# Patient Record
Sex: MALE | Race: WHITE | NOT HISPANIC OR LATINO | Employment: OTHER | URBAN - METROPOLITAN AREA
[De-identification: names, ages, dates, MRNs, and addresses within clinical notes are randomized per-mention and may not be internally consistent; named-entity substitution may affect disease eponyms.]

---

## 2018-07-02 ENCOUNTER — RECORDS - HEALTHEAST (OUTPATIENT)
Dept: LAB | Facility: CLINIC | Age: 58
End: 2018-07-02

## 2018-07-03 LAB
CHOLEST SERPL-MCNC: 198 MG/DL
FASTING STATUS PATIENT QL REPORTED: ABNORMAL
HDLC SERPL-MCNC: 44 MG/DL
LDLC SERPL CALC-MCNC: 141 MG/DL
PSA SERPL-MCNC: 0.6 NG/ML (ref 0–3.5)
TRIGL SERPL-MCNC: 63 MG/DL

## 2020-06-07 ENCOUNTER — APPOINTMENT (OUTPATIENT)
Dept: ULTRASOUND IMAGING | Facility: CLINIC | Age: 60
End: 2020-06-07
Attending: FAMILY MEDICINE
Payer: COMMERCIAL

## 2020-06-07 ENCOUNTER — HOSPITAL ENCOUNTER (EMERGENCY)
Facility: CLINIC | Age: 60
Discharge: HOME | End: 2020-06-07
Attending: FAMILY MEDICINE | Admitting: FAMILY MEDICINE
Payer: COMMERCIAL

## 2020-06-07 VITALS
WEIGHT: 185 LBS | SYSTOLIC BLOOD PRESSURE: 146 MMHG | OXYGEN SATURATION: 96 % | HEIGHT: 70 IN | HEART RATE: 82 BPM | RESPIRATION RATE: 18 BRPM | DIASTOLIC BLOOD PRESSURE: 85 MMHG | BODY MASS INDEX: 26.48 KG/M2 | TEMPERATURE: 99.1 F

## 2020-06-07 DIAGNOSIS — L03.116 CELLULITIS OF LEFT LOWER EXTREMITY: ICD-10-CM

## 2020-06-07 LAB
ALBUMIN SERPL-MCNC: 3.6 G/DL (ref 3.4–5)
ALP SERPL-CCNC: 95 U/L (ref 40–150)
ALT SERPL W P-5'-P-CCNC: 22 U/L (ref 0–70)
ANION GAP SERPL CALCULATED.3IONS-SCNC: 10 MMOL/L (ref 3–14)
AST SERPL W P-5'-P-CCNC: 17 U/L (ref 0–45)
BASOPHILS # BLD AUTO: 0.1 10E9/L (ref 0–0.2)
BASOPHILS NFR BLD AUTO: 0.5 %
BILIRUB SERPL-MCNC: 0.5 MG/DL (ref 0.2–1.3)
BUN SERPL-MCNC: 16 MG/DL (ref 7–30)
CALCIUM SERPL-MCNC: 9 MG/DL (ref 8.5–10.1)
CHLORIDE SERPL-SCNC: 106 MMOL/L (ref 94–109)
CO2 SERPL-SCNC: 22 MMOL/L (ref 20–32)
CREAT SERPL-MCNC: 0.9 MG/DL (ref 0.66–1.25)
CRP SERPL-MCNC: 139 MG/L (ref 0–8)
DIFFERENTIAL METHOD BLD: NORMAL
EOSINOPHIL # BLD AUTO: 0.1 10E9/L (ref 0–0.7)
EOSINOPHIL NFR BLD AUTO: 0.8 %
ERYTHROCYTE [DISTWIDTH] IN BLOOD BY AUTOMATED COUNT: 12.9 % (ref 10–15)
ERYTHROCYTE [SEDIMENTATION RATE] IN BLOOD BY WESTERGREN METHOD: 16 MM/H (ref 0–20)
GFR SERPL CREATININE-BSD FRML MDRD: >90 ML/MIN/{1.73_M2}
GLUCOSE SERPL-MCNC: 107 MG/DL (ref 70–99)
HCT VFR BLD AUTO: 47.8 % (ref 40–53)
HGB BLD-MCNC: 16.3 G/DL (ref 13.3–17.7)
IMM GRANULOCYTES # BLD: 0 10E9/L (ref 0–0.4)
IMM GRANULOCYTES NFR BLD: 0.4 %
LYMPHOCYTES # BLD AUTO: 2 10E9/L (ref 0.8–5.3)
LYMPHOCYTES NFR BLD AUTO: 20.5 %
MCH RBC QN AUTO: 30.2 PG (ref 26.5–33)
MCHC RBC AUTO-ENTMCNC: 34.1 G/DL (ref 31.5–36.5)
MCV RBC AUTO: 89 FL (ref 78–100)
MONOCYTES # BLD AUTO: 1.3 10E9/L (ref 0–1.3)
MONOCYTES NFR BLD AUTO: 13 %
NEUTROPHILS # BLD AUTO: 6.4 10E9/L (ref 1.6–8.3)
NEUTROPHILS NFR BLD AUTO: 64.8 %
NRBC # BLD AUTO: 0 10*3/UL
NRBC BLD AUTO-RTO: 0 /100
PLATELET # BLD AUTO: 194 10E9/L (ref 150–450)
POTASSIUM SERPL-SCNC: 3.6 MMOL/L (ref 3.4–5.3)
PROT SERPL-MCNC: 8.1 G/DL (ref 6.8–8.8)
RBC # BLD AUTO: 5.39 10E12/L (ref 4.4–5.9)
SODIUM SERPL-SCNC: 138 MMOL/L (ref 133–144)
WBC # BLD AUTO: 9.8 10E9/L (ref 4–11)

## 2020-06-07 PROCEDURE — 99284 EMERGENCY DEPT VISIT MOD MDM: CPT | Mod: 25 | Performed by: FAMILY MEDICINE

## 2020-06-07 PROCEDURE — 86140 C-REACTIVE PROTEIN: CPT | Performed by: FAMILY MEDICINE

## 2020-06-07 PROCEDURE — 93971 EXTREMITY STUDY: CPT | Mod: LT

## 2020-06-07 PROCEDURE — 80053 COMPREHEN METABOLIC PANEL: CPT | Performed by: FAMILY MEDICINE

## 2020-06-07 PROCEDURE — 99284 EMERGENCY DEPT VISIT MOD MDM: CPT | Mod: Z6 | Performed by: FAMILY MEDICINE

## 2020-06-07 PROCEDURE — 85025 COMPLETE CBC W/AUTO DIFF WBC: CPT | Performed by: FAMILY MEDICINE

## 2020-06-07 PROCEDURE — 85652 RBC SED RATE AUTOMATED: CPT | Performed by: FAMILY MEDICINE

## 2020-06-07 RX ORDER — CEPHALEXIN 500 MG/1
500 CAPSULE ORAL 4 TIMES DAILY
Qty: 40 CAPSULE | Refills: 0 | Status: SHIPPED | OUTPATIENT
Start: 2020-06-07 | End: 2020-06-17

## 2020-06-07 ASSESSMENT — MIFFLIN-ST. JEOR: SCORE: 1660.4

## 2020-06-07 NOTE — ED AVS SNAPSHOT
[facility] Emergency Department  [address] [address]  Phone:  [phone]  Fax:  [phone]                                    Navdeep Dunn   MRN: [id number]    Department:  [facility] Emergency Department   Date of Visit:  6/7/2020           After Visit Summary Signature Page    I have received my discharge instructions, and my questions have been answered. I have discussed any challenges I see with this plan with the nurse or doctor.    ..........................................................................................................................................  Patient/Patient Representative Signature      ..........................................................................................................................................  Patient Representative Print Name and Relationship to Patient    ..................................................               ................................................  Date                                   Time    ..........................................................................................................................................  Reviewed by Signature/Title    ...................................................              ..............................................  Date                                               Time          22EPIC Rev 08/18

## 2020-06-08 NOTE — DISCHARGE INSTRUCTIONS
Return to the Emergency Room if the following occurs:     Severely worsened pain, expanding redness and swelling, fever >101, or for any concern at anytime.    Or, follow-up with the following provider as we discussed:     Call for dermatology appointment on Monday, see contact information provided for scheduling.    Medications discussed:    Keflex.    If you received pain-relieving or sedating medication during your time in the ER, avoid alcohol, driving automobiles, or working with machinery.  Also, a responsible adult must stay with you.        Call the Nurse Advice Line at [phone] or [phone] for any concern at anytime.

## 2020-06-08 NOTE — ED NOTES
"Pt c/o eczema like rash to front of his lower leg, \"it's been there for years\" - yesterday he noticed a rash to the back of his leg that wraps around his inner thigh with some streaking up the leg to his inner thigh. 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 Pulses are present and color is appropriate.  "

## 2020-06-08 NOTE — ED PROVIDER NOTES
"  HPI   Patient is a 59-year-old male presenting with concern about a rash involving his left lower extremity.  He recognized a dry, pruritic area on the anterior portion starting a few weeks ago.  It has not improved over this time.  Then, starting on Friday, 2 days ago, the patient recognized new redness, swelling, and tenderness along the lower leg.  This has expanded since that time.  He describes calf pain especially along the medial aspect.  He describes some radiating tenderness and pain along the medial calf to the knee and lower thigh.  No fever.  He does not feel sick.  He denies chest pain or shortness of breath.  He denies trauma or injury.  He denies intraoral rash or ulceration.  No obvious blood loss.  No history of pulmonary pathology.  No history of cardiac pathology.  He does not take medication on a regular basis.  He does not smoke.  He does not drink alcohol regularly.  No drugs of abuse.        Allergies:  Allergies   Allergen Reactions     Amoxicillin Hives     Problem List:    There are no active problems to display for this patient.     Past Medical History:    No past medical history on file.  Past Surgical History:    No past surgical history on file.  Family History:    No family history on file.  Social History:  Marital Status:   [2]  Social History     Tobacco Use     Smoking status: Not on file   Substance Use Topics     Alcohol use: Not on file     Drug use: Not on file      Medications:    cephALEXin (KEFLEX) 500 MG capsule      Review of Systems   All other systems reviewed and are negative.      PE   BP: (!) 146/85  Pulse: 82  Temp: 99.1  F (37.3  C)  Resp: 18  Height: 177.8 cm (5' 10\")  Weight: 83.9 kg (185 lb)(stated)  SpO2: 96 %  Physical Exam  Vitals signs and nursing note reviewed.   Constitutional:       General: He is not in acute distress.     Appearance: He is not diaphoretic.   HENT:      Head: Atraumatic.      Nose: Nose normal.      Mouth/Throat:      Mouth: " Mucous membranes are moist.      Pharynx: Oropharynx is clear.   Eyes:      General: No scleral icterus.     Pupils: Pupils are equal, round, and reactive to light.   Neck:      Musculoskeletal: Normal range of motion.   Pulmonary:      Effort: No respiratory distress.   Abdominal:      Tenderness: There is no abdominal tenderness.   Musculoskeletal: Normal range of motion.      Comments: Tender over the rash as described below.  No diffuse calf tenderness.  Full range of motion of his left hip and knee and ankle joints.  These are not swollen or tender.  Otherwise not tender to palpation over other major muscles, joints, or long bones.   Skin:     General: Skin is warm.      Findings: No rash.      Comments: See photos.   Neurological:      Mental Status: He is alert and oriented to person, place, and time.   Psychiatric:         Behavior: Behavior normal.                       ED COURSE and [facility]   1953.  The patient has a rash on his left lower extremity, as pictured above.  Infectious?  Vasculitis?  Vascular?  Ultrasound and lab values pending.    2116.  Ultrasound unremarkable.  Keflex will be provided for concern of infection.  Dermatology follow-up recommended for concern of possible vasculitis, especially if not improved over the next 2 to 3 days.  Return here for worsening as discussed.    LABS  Labs Ordered and Resulted from Time of ED Arrival Up to the Time of Departure from the ED   COMPREHENSIVE METABOLIC PANEL - Abnormal; Notable for the following components:       Result Value    Glucose 107 (*)     All other components within normal limits   CRP INFLAMMATION - Abnormal; Notable for the following components:    CRP Inflammation 139.0 (*)     All other components within normal limits   CBC WITH PLATELETS DIFFERENTIAL   ERYTHROCYTE SEDIMENTATION RATE AUTO       IMAGING  Images reviewed by me.  Radiology report also reviewed.  US Lower Extremity Venous Duplex Left   Preliminary Result   IMPRESSION: No  evidence for DVT within the left lower extremity.          Procedures    Medications - No data to display      IMPRESSION       ICD-10-CM    1. Cellulitis of left lower extremity  L03.116             Medication List      Started    cephALEXin 500 MG capsule  Commonly known as:  KEFLEX  500 mg, Oral, 4 TIMES DAILY                          Emile Santillan MD  06/07/20 8119

## 2020-06-19 ENCOUNTER — APPOINTMENT (OUTPATIENT)
Dept: GENERAL RADIOLOGY | Facility: CLINIC | Age: 60
End: 2020-06-19
Attending: EMERGENCY MEDICINE
Payer: COMMERCIAL

## 2020-06-19 ENCOUNTER — HOSPITAL ENCOUNTER (EMERGENCY)
Facility: CLINIC | Age: 60
Discharge: HOME | End: 2020-06-19
Attending: EMERGENCY MEDICINE | Admitting: EMERGENCY MEDICINE
Payer: COMMERCIAL

## 2020-06-19 VITALS
HEIGHT: 70 IN | WEIGHT: 185 LBS | HEART RATE: 81 BPM | OXYGEN SATURATION: 97 % | SYSTOLIC BLOOD PRESSURE: 148 MMHG | TEMPERATURE: 98.2 F | BODY MASS INDEX: 26.48 KG/M2 | DIASTOLIC BLOOD PRESSURE: 95 MMHG | RESPIRATION RATE: 35 BRPM

## 2020-06-19 DIAGNOSIS — I20.0 UNSTABLE ANGINA (H): ICD-10-CM

## 2020-06-19 LAB
ALBUMIN SERPL-MCNC: 3.5 G/DL (ref 3.4–5)
ALP SERPL-CCNC: 106 U/L (ref 40–150)
ALT SERPL W P-5'-P-CCNC: 30 U/L (ref 0–70)
ANION GAP SERPL CALCULATED.3IONS-SCNC: 9 MMOL/L (ref 3–14)
AST SERPL W P-5'-P-CCNC: 19 U/L (ref 0–45)
BASOPHILS # BLD AUTO: 0.1 10E9/L (ref 0–0.2)
BASOPHILS NFR BLD AUTO: 0.7 %
BILIRUB SERPL-MCNC: 0.3 MG/DL (ref 0.2–1.3)
BUN SERPL-MCNC: 13 MG/DL (ref 7–30)
CALCIUM SERPL-MCNC: 8.5 MG/DL (ref 8.5–10.1)
CHLORIDE SERPL-SCNC: 107 MMOL/L (ref 94–109)
CO2 SERPL-SCNC: 25 MMOL/L (ref 20–32)
CREAT SERPL-MCNC: 1.01 MG/DL (ref 0.66–1.25)
D DIMER PPP FEU-MCNC: <0.3 UG/ML FEU (ref 0–0.5)
DIFFERENTIAL METHOD BLD: ABNORMAL
EOSINOPHIL # BLD AUTO: 0.1 10E9/L (ref 0–0.7)
EOSINOPHIL NFR BLD AUTO: 0.7 %
ERYTHROCYTE [DISTWIDTH] IN BLOOD BY AUTOMATED COUNT: 13 % (ref 10–15)
GFR SERPL CREATININE-BSD FRML MDRD: 80 ML/MIN/{1.73_M2}
GLUCOSE SERPL-MCNC: 126 MG/DL (ref 70–99)
HCT VFR BLD AUTO: 46.2 % (ref 40–53)
HGB BLD-MCNC: 15.5 G/DL (ref 13.3–17.7)
IMM GRANULOCYTES # BLD: 0.1 10E9/L (ref 0–0.4)
IMM GRANULOCYTES NFR BLD: 0.5 %
LACTATE BLD-SCNC: 2.5 MMOL/L (ref 0.7–2)
LYMPHOCYTES # BLD AUTO: 1.5 10E9/L (ref 0.8–5.3)
LYMPHOCYTES NFR BLD AUTO: 12.1 %
MCH RBC QN AUTO: 30.3 PG (ref 26.5–33)
MCHC RBC AUTO-ENTMCNC: 33.5 G/DL (ref 31.5–36.5)
MCV RBC AUTO: 90 FL (ref 78–100)
MONOCYTES # BLD AUTO: 1 10E9/L (ref 0–1.3)
MONOCYTES NFR BLD AUTO: 8.2 %
NEUTROPHILS # BLD AUTO: 9.6 10E9/L (ref 1.6–8.3)
NEUTROPHILS NFR BLD AUTO: 77.8 %
NRBC # BLD AUTO: 0 10*3/UL
NRBC BLD AUTO-RTO: 0 /100
PLATELET # BLD AUTO: 266 10E9/L (ref 150–450)
POTASSIUM SERPL-SCNC: 4 MMOL/L (ref 3.4–5.3)
PROT SERPL-MCNC: 7.4 G/DL (ref 6.8–8.8)
RBC # BLD AUTO: 5.12 10E12/L (ref 4.4–5.9)
SODIUM SERPL-SCNC: 141 MMOL/L (ref 133–144)
TROPONIN I SERPL-MCNC: <0.015 UG/L (ref 0–0.04)
WBC # BLD AUTO: 12.4 10E9/L (ref 4–11)

## 2020-06-19 PROCEDURE — 25000132 ZZH RX MED GY IP 250 OP 250 PS 637: Performed by: EMERGENCY MEDICINE

## 2020-06-19 PROCEDURE — 96374 THER/PROPH/DIAG INJ IV PUSH: CPT | Performed by: EMERGENCY MEDICINE

## 2020-06-19 PROCEDURE — 85025 COMPLETE CBC W/AUTO DIFF WBC: CPT | Performed by: EMERGENCY MEDICINE

## 2020-06-19 PROCEDURE — 93005 ELECTROCARDIOGRAM TRACING: CPT | Performed by: EMERGENCY MEDICINE

## 2020-06-19 PROCEDURE — 93010 ELECTROCARDIOGRAM REPORT: CPT | Mod: Z6 | Performed by: EMERGENCY MEDICINE

## 2020-06-19 PROCEDURE — 25800030 ZZH RX IP 258 OP 636: Performed by: EMERGENCY MEDICINE

## 2020-06-19 PROCEDURE — 71045 X-RAY EXAM CHEST 1 VIEW: CPT

## 2020-06-19 PROCEDURE — 80053 COMPREHEN METABOLIC PANEL: CPT | Performed by: EMERGENCY MEDICINE

## 2020-06-19 PROCEDURE — 84484 ASSAY OF TROPONIN QUANT: CPT | Performed by: EMERGENCY MEDICINE

## 2020-06-19 PROCEDURE — 96361 HYDRATE IV INFUSION ADD-ON: CPT | Performed by: EMERGENCY MEDICINE

## 2020-06-19 PROCEDURE — 25000128 H RX IP 250 OP 636

## 2020-06-19 PROCEDURE — 99285 EMERGENCY DEPT VISIT HI MDM: CPT | Mod: 25 | Performed by: EMERGENCY MEDICINE

## 2020-06-19 PROCEDURE — 83605 ASSAY OF LACTIC ACID: CPT | Performed by: EMERGENCY MEDICINE

## 2020-06-19 PROCEDURE — 85379 FIBRIN DEGRADATION QUANT: CPT | Performed by: EMERGENCY MEDICINE

## 2020-06-19 RX ORDER — ASPIRIN 81 MG/1
324 TABLET, CHEWABLE ORAL ONCE
Status: COMPLETED | OUTPATIENT
Start: 2020-06-19 | End: 2020-06-19

## 2020-06-19 RX ORDER — HEPARIN SODIUM 10000 [USP'U]/100ML
12 INJECTION, SOLUTION INTRAVENOUS CONTINUOUS
Status: DISCONTINUED | OUTPATIENT
Start: 2020-06-19 | End: 2020-06-19 | Stop reason: HOSPADM

## 2020-06-19 RX ADMIN — SODIUM CHLORIDE, POTASSIUM CHLORIDE, SODIUM LACTATE AND CALCIUM CHLORIDE 1000 ML: 600; 310; 30; 20 INJECTION, SOLUTION INTRAVENOUS at 17:46

## 2020-06-19 RX ADMIN — HEPARIN SODIUM 12 UNITS/KG/HR: 10000 INJECTION, SOLUTION INTRAVENOUS at 19:33

## 2020-06-19 RX ADMIN — ASPIRIN 81 MG 324 MG: 81 TABLET ORAL at 17:22

## 2020-06-19 RX ADMIN — Medication 4644 UNITS: at 19:40

## 2020-06-19 ASSESSMENT — MIFFLIN-ST. JEOR: SCORE: 1660.4

## 2020-06-19 NOTE — ED NOTES
Bed: ED02  Expected date: 6/19/20  Expected time:   Means of arrival:   Comments:  Ambulance - Navdeep

## 2020-06-19 NOTE — ED PROVIDER NOTES
History     Chief Complaint   Patient presents with     Dizziness     Pt reports he was putting a tarp on his boat and became lighheaded and dizzy, thought he was going to pass out      HPI  Navdeep Dunn is a 59 year old male who resents from home by ambulance.  He was off work early, working on a tarp for his boat lift, had got about snf through attaching the boat left when he suddenly became very dyspneic, lightheaded and diaphoretic.  He hopped on his 4 magdaleno and tried to drive up to the house, prior to arrival to the house he had large runny stool incontinence, went in the bathroom for another 10 minutes the whole while diaphoretic feeling faint and dyspneic.  Symptoms lasted a total about 2030 minutes.  Denies associated chest pain or palpitations.  He denies alcohol use since 2006.  He chews nicotine gum.  He is on no medications.  Denies history of diabetes, hypertension or hyperlipidemia.  Father with history of defibrillator pacer.  Works as a .  Denies recent illness with exception of left lower extremity cellulitis, seen here on 6/7, note reviewed, reports resolution of rash and pain 2 to 3 days after starting antibiotic.  Had negative left lower extremity duplex venous ultrasound study at time of visit on 6/7.  He denies recent exertional intolerance.  Denies cough or fever, denies exposure to COVID-19 disease.    Allergies:  Allergies   Allergen Reactions     Amoxicillin Hives       Problem List:    There are no active problems to display for this patient.       Past Medical History:    No past medical history on file.    Past Surgical History:    No past surgical history on file.    Family History:    No family history on file.    Social History:  Marital Status:   [2]  Social History     Tobacco Use     Smoking status: Not on file   Substance Use Topics     Alcohol use: Not on file     Drug use: Not on file        Medications:    No current outpatient medications  "on file.        Review of Systems  All other systems reviewed and are negative.    Physical Exam   BP: (!) 144/85  Pulse: 79  Heart Rate: 79  Temp: 98.2  F (36.8  C)  Resp: 18  Height: 177.8 cm (5' 10\")  Weight: 83.9 kg (185 lb)  SpO2: 97 %      Physical Exam  Nontoxic appearing no respiratory distress alert and oriented ×3  Head atraumatic normocephalic  TMs/EACs unremarkable, conjunctiva noninjected, oropharynx moist without lesions or erythema  No cervical adenopathy   Neck supple full active painless range of motion  Lungs clear to auscultation  Heart regular no murmur  Abdomen soft mild epigastric tenderness without guarding or rebound bowel sounds positive   Strength and sensation grossly intact throughout the extremities, gait and station normal  Speech is fluent, good eye contact, thought processes are rational  Lower extremities without swelling, redness or tenderness  Pedal pulses symmetrical and strong    ED Course        Procedures  EKG normal sinus rhythm rate 82, axis intervals normal, biphasic/negative precordial T waves, no prior for comparison, read by Dr. David Watson             Critical Care time:  none               Results for orders placed or performed during the hospital encounter of 06/19/20 (from the past 24 hour(s))   CBC with platelets differential   Result Value Ref Range    WBC 12.4 (H) 4.0 - 11.0 10e9/L    RBC Count 5.12 4.4 - 5.9 10e12/L    Hemoglobin 15.5 13.3 - 17.7 g/dL    Hematocrit 46.2 40.0 - 53.0 %    MCV 90 78 - 100 fl    MCH 30.3 26.5 - 33.0 pg    MCHC 33.5 31.5 - 36.5 g/dL    RDW 13.0 10.0 - 15.0 %    Platelet Count 266 150 - 450 10e9/L    Diff Method Automated Method     % Neutrophils 77.8 %    % Lymphocytes 12.1 %    % Monocytes 8.2 %    % Eosinophils 0.7 %    % Basophils 0.7 %    % Immature Granulocytes 0.5 %    Nucleated RBCs 0 0 /100    Absolute Neutrophil 9.6 (H) 1.6 - 8.3 10e9/L    Absolute Lymphocytes 1.5 0.8 - 5.3 10e9/L    Absolute Monocytes 1.0 0.0 - 1.3 10e9/L "    Absolute Eosinophils 0.1 0.0 - 0.7 10e9/L    Absolute Basophils 0.1 0.0 - 0.2 10e9/L    Abs Immature Granulocytes 0.1 0 - 0.4 10e9/L    Absolute Nucleated RBC 0.0    D dimer quantitative   Result Value Ref Range    D Dimer <0.3 0.0 - 0.50 ug/ml FEU   Comprehensive metabolic panel   Result Value Ref Range    Sodium 141 133 - 144 mmol/L    Potassium 4.0 3.4 - 5.3 mmol/L    Chloride 107 94 - 109 mmol/L    Carbon Dioxide 25 20 - 32 mmol/L    Anion Gap 9 3 - 14 mmol/L    Glucose 126 (H) 70 - 99 mg/dL    Urea Nitrogen 13 7 - 30 mg/dL    Creatinine 1.01 0.66 - 1.25 mg/dL    GFR Estimate 80 >60 mL/min/[1.73_m2]    GFR Estimate If Black >90 >60 mL/min/[1.73_m2]    Calcium 8.5 8.5 - 10.1 mg/dL    Bilirubin Total 0.3 0.2 - 1.3 mg/dL    Albumin 3.5 3.4 - 5.0 g/dL    Protein Total 7.4 6.8 - 8.8 g/dL    Alkaline Phosphatase 106 40 - 150 U/L    ALT 30 0 - 70 U/L    AST 19 0 - 45 U/L   Lactic acid whole blood   Result Value Ref Range    Lactic Acid 2.5 (H) 0.7 - 2.0 mmol/L   Troponin I   Result Value Ref Range    Troponin I ES <0.015 0.000 - 0.045 ug/L   XR Chest Port 1 View    Narrative    CHEST ONE VIEW PORTABLE    6/19/2020 6:54 PM     HISTORY: Dyspnea    COMPARISON: None.      Impression    IMPRESSION: Portable chest. Lungs are clear. Heart is normal in size.  No pneumothorax. No definite pleural effusions.    TANYA COX MD       Medications   heparin 25,000 units in 0.45% NaCl 250 mL ANTICOAGULANT  infusion (12 Units/kg/hr × 77.4 kg (Adjusted) Intravenous New Bag 6/19/20 1933)   aspirin (ASA) chewable tablet 324 mg (324 mg Oral Given 6/19/20 1722)   lactated ringers BOLUS 1,000 mL (0 mLs Intravenous Stopped 6/19/20 1850)   heparin ANTICOAGULANT  Loading Dose bolus dose from infusion pump 4,644 Units (4,644 Units Intravenous Given 6/19/20 1940)       Assessments & Plan (with Medical Decision Making)  59-year-old male near syncopal episode with shortness of air diaphoresis and loss control of bowel, onset during moderate  exertion.  T wave changes anteroseptal leads concerning for LAD ischemia.  Troponin normal chest x-ray by my review as well as radiology negative for acute finding.  Patient is transferred to [facility] for further evaluation possible cardiology intervention.  Treated with aspirin and low-dose heparin remained stable and asymptomatic during stay.     I have reviewed the nursing notes.    I have reviewed the findings, diagnosis, plan and need for follow up with the patient.          Discharge Medication List as of 6/19/2020  9:01 PM          Final diagnoses:   Unstable angina (H)       6/19/2020   [facility] EMERGENCY DEPARTMENT     David Watson MD  06/19/20 0389

## 2020-06-19 NOTE — ED NOTES
DATE:  6/19/2020   TIME OF RECEIPT FROM LAB:  5:37 PM   LAB TEST:  Lactic   LAB VALUE:  2.5  RESULTS GIVEN WITH READ-BACK TO (PROVIDER):  Dr. Watson   TIME LAB VALUE REPORTED TO PROVIDER:   5:37 PM

## 2020-06-24 ENCOUNTER — COMMUNICATION - HEALTHEAST (OUTPATIENT)
Dept: EMERGENCY MEDICINE | Facility: CLINIC | Age: 60
End: 2020-06-24

## 2020-06-25 ENCOUNTER — RECORDS - HEALTHEAST (OUTPATIENT)
Dept: LAB | Facility: CLINIC | Age: 60
End: 2020-06-25

## 2020-06-29 ENCOUNTER — HOSPITAL ENCOUNTER (OUTPATIENT)
Dept: CARDIOLOGY | Facility: CLINIC | Age: 60
Discharge: HOME | End: 2020-06-29
Attending: INTERNAL MEDICINE

## 2020-06-29 ENCOUNTER — HOSPITAL ENCOUNTER (OUTPATIENT)
Dept: NUCLEAR MEDICINE | Facility: CLINIC | Age: 60
Discharge: HOME | End: 2020-06-29
Attending: INTERNAL MEDICINE

## 2020-06-29 DIAGNOSIS — R06.09 DYSPNEA ON EXERTION: ICD-10-CM

## 2020-06-29 LAB
B BURGDOR IGG+IGM SER QL: 0.09 INDEX VALUE
COVID-19 ANTIBODY IGG: NEGATIVE
CV STRESS CURRENT BP HE: NORMAL
CV STRESS CURRENT HR HE: 64
CV STRESS CURRENT HR HE: 65
CV STRESS CURRENT HR HE: 70
CV STRESS CURRENT HR HE: 71
CV STRESS CURRENT HR HE: 73
CV STRESS CURRENT HR HE: 74
CV STRESS CURRENT HR HE: 74
CV STRESS CURRENT HR HE: 75
CV STRESS CURRENT HR HE: 80
CV STRESS CURRENT HR HE: 84
CV STRESS CURRENT HR HE: 87
CV STRESS CURRENT HR HE: 87
CV STRESS CURRENT HR HE: 91
CV STRESS CURRENT HR HE: 91
CV STRESS CURRENT HR HE: 96
CV STRESS CURRENT HR HE: 98
CV STRESS CURRENT HR HE: 98
CV STRESS CURRENT HR HE: 99
CV STRESS DEVIATION TIME HE: NORMAL
CV STRESS ECHO PERCENT HR HE: NORMAL
CV STRESS EXERCISE STAGE HE: NORMAL
CV STRESS FINAL RESTING BP HE: NORMAL
CV STRESS FINAL RESTING HR HE: 71
CV STRESS MAX HR HE: 100
CV STRESS MAX TREADMILL GRADE HE: 0
CV STRESS MAX TREADMILL SPEED HE: 0
CV STRESS PEAK DIA BP HE: NORMAL
CV STRESS PEAK SYS BP HE: NORMAL
CV STRESS PHASE HE: NORMAL
CV STRESS PROTOCOL HE: NORMAL
CV STRESS RESTING PT POSITION HE: NORMAL
CV STRESS ST DEVIATION AMOUNT HE: NORMAL
CV STRESS ST DEVIATION ELEVATION HE: NORMAL
CV STRESS ST EVELATION AMOUNT HE: NORMAL
CV STRESS TEST TYPE HE: NORMAL
CV STRESS TOTAL STAGE TIME MIN 1 HE: NORMAL
NUC REST EJECTION FRACTION: 70 %
RATE PRESSURE PRODUCT: NORMAL
STRESS ECHO BASELINE DIASTOLIC HE: 85
STRESS ECHO BASELINE HR: 60
STRESS ECHO BASELINE SYSTOLIC BP: 157
STRESS ECHO CALCULATED PERCENT HR: 63 %
STRESS ECHO LAST STRESS DIASTOLIC BP: 83
STRESS ECHO LAST STRESS HR: 91
STRESS ECHO LAST STRESS SYSTOLIC BP: 167
STRESS ECHO POST ESTIMATED WORKLOAD: 0
STRESS ECHO POST EXERCISE DUR MIN: 0
STRESS ECHO POST EXERCISE DUR SEC: 0
STRESS ECHO TARGET HR: 160

## 2020-07-07 ENCOUNTER — COMMUNICATION - HEALTHEAST (OUTPATIENT)
Dept: CARDIOLOGY | Facility: CLINIC | Age: 60
End: 2020-07-07

## 2020-11-16 ENCOUNTER — HEALTH MAINTENANCE LETTER (OUTPATIENT)
Age: 60
End: 2020-11-16

## 2021-05-24 ENCOUNTER — RECORDS - HEALTHEAST (OUTPATIENT)
Dept: ADMINISTRATIVE | Facility: CLINIC | Age: 61
End: 2021-05-24

## 2021-05-29 ENCOUNTER — RECORDS - HEALTHEAST (OUTPATIENT)
Dept: ADMINISTRATIVE | Facility: CLINIC | Age: 61
End: 2021-05-29

## 2021-06-09 NOTE — TELEPHONE ENCOUNTER
----- Message from Dina Boswell sent at 7/7/2020  3:47 PM CDT -----  General phone call:  PLEASE CALL PATIENT ABOUT NUC TEST RESULTS, PATIENT WAS SEEN IN HOSPITAL BY DR LANDRUM ON 6-. PLEASE CALL  Caller: BRANDON BERG, PT CALLED THERE FOR RESULTS  Primary cardiologist: DR LANDRUM  Detailed reason for call: SEE ABOVE  New or active symptoms? NO  Best phone number: [phone]  Best time to contact: ANY TIME  Ok to leave a detailed message? YES  Device? NO    Additional Info:

## 2021-06-09 NOTE — TELEPHONE ENCOUNTER
Dr. Liu,  Patient discharged with outpatient order for NM Stress Test and  2 week FELICIA. FELICIA hooked up 6/29. Patient seen in consult by you 6/20.  [facility] clinic requesting for you to review stress test results and advise?  Any other recommendations?  Thank you - Kerrie

## 2021-06-09 NOTE — TELEPHONE ENCOUNTER
Let him know that stress test is normal. Awaiting review of monitor. No further recommendations at this time.    KML

## 2021-06-09 NOTE — TELEPHONE ENCOUNTER
PC to patient with results of stress test. Patient will await results of monitor. No further questions or concerns at this time.

## 2021-06-16 PROBLEM — R42 DIZZINESS: Status: ACTIVE | Noted: 2020-06-19

## 2021-06-17 NOTE — TELEPHONE ENCOUNTER
Telephone Encounter by Kerrie Morrow RN at 7/8/2020 10:58 AM     Author: Kerrie Morrow RN Service: -- Author Type: Registered Nurse    Filed: 7/8/2020 11:00 AM Encounter Date: 7/7/2020 Status: Signed    : Kerrie Morrow RN (Registered Nurse)       Clarke, Kerrie [place], [place]               I  will fax stress test.   Arabella    Previous Messages     ----- Message -----   From: Kerrie Morrow RN   Sent: 7/8/2020  10:11 AM CDT   To: Arabella Woodard   Subject: FW: Fax results / KML                             Hi,   Can you please fax results as requested?   Thank you!   ----- Message -----   From: Anju Lindsay   Sent: 7/8/2020   9:44 AM CDT   To: Veronica Singh RN   Subject: Fax results / KML                                 Caller: Imelda with [facility]     Primary cardiologist: Dr. Liu     Detailed reason for call: Please fax stress test results to clinic, they are aware PAMs data is not available yet. Call Imelda if questions.     Phone number for [facility] is [phone], fax number is [phone] - Attention Dr. Cottrell.     Best time to contact: Soonest possible     Ok to leave a detailed message? Yes     Device? No

## 2021-06-20 NOTE — LETTER
Letter by Lary Cristina RN at      Author: Lary Cristina RN Service: -- Author Type: --    Filed:  Encounter Date: 6/24/2020 Status: (Other)       6/24/2020        Navdeep Dunn  [address] [facility] [address]    This letter provides a written record that you were tested for COVID-19 on 6/23/20.     Your result was negative. This means that we didnt find the virus that causes COVID-19 in your sample. A test may show negative when you do actually have the virus. This can happen when the virus is in the early stages of infection, before you feel illness symptoms.    If you have symptoms   Stay home and away from others (self-isolate) until you meet ALL of the guidelines below:    Youve had no fever--and no medicine that reduces fever--for 3 full days (72 hours). And ?    Your other symptoms have gotten better. For example, your cough or breathing has improved. And?    At least 10 days have passed since your symptoms started.    During this time:    Stay home. Dont go to work, school or anywhere else.     Stay in your own room, including for meals. Use your own bathroom if you can.    Stay away from others in your home. No hugging, kissing or shaking hands. No visitors.    Clean high touch surfaces often (doorknobs, counters, handles, etc.). Use a household cleaning spray or wipes. You can find a full list on the EPA website at www.epa.gov/pesticide-registration/list-n-disinfectants-use-against-sars-cov-2.    Cover your mouth and nose with a mask, tissue or washcloth to avoid spreading germs.    Wash your hands and face often with soap and water.    Going back to work  Check with your employer for any guidelines to follow for going back to work.    Employers: This document serves as formal notice that your employee tested negative for COVID-19, as of the testing date shown above.

## 2021-08-05 ENCOUNTER — OFFICE VISIT (OUTPATIENT)
Dept: FAMILY MEDICINE | Facility: CLINIC | Age: 61
End: 2021-08-05
Payer: COMMERCIAL

## 2021-08-05 VITALS
TEMPERATURE: 99.4 F | SYSTOLIC BLOOD PRESSURE: 138 MMHG | OXYGEN SATURATION: 97 % | DIASTOLIC BLOOD PRESSURE: 84 MMHG | RESPIRATION RATE: 14 BRPM | HEART RATE: 93 BPM | WEIGHT: 191 LBS | BODY MASS INDEX: 27.35 KG/M2 | HEIGHT: 70 IN

## 2021-08-05 DIAGNOSIS — Z12.11 SPECIAL SCREENING FOR MALIGNANT NEOPLASMS, COLON: ICD-10-CM

## 2021-08-05 DIAGNOSIS — R07.0 THROAT PAIN: Primary | ICD-10-CM

## 2021-08-05 LAB — DEPRECATED S PYO AG THROAT QL EIA: NEGATIVE

## 2021-08-05 PROCEDURE — U0003 INFECTIOUS AGENT DETECTION BY NUCLEIC ACID (DNA OR RNA); SEVERE ACUTE RESPIRATORY SYNDROME CORONAVIRUS 2 (SARS-COV-2) (CORONAVIRUS DISEASE [COVID-19]), AMPLIFIED PROBE TECHNIQUE, MAKING USE OF HIGH THROUGHPUT TECHNOLOGIES AS DESCRIBED BY CMS-2020-01-R: HCPCS | Performed by: NURSE PRACTITIONER

## 2021-08-05 PROCEDURE — 87651 STREP A DNA AMP PROBE: CPT | Performed by: NURSE PRACTITIONER

## 2021-08-05 PROCEDURE — U0005 INFEC AGEN DETEC AMPLI PROBE: HCPCS | Performed by: NURSE PRACTITIONER

## 2021-08-05 PROCEDURE — 99203 OFFICE O/P NEW LOW 30 MIN: CPT | Performed by: NURSE PRACTITIONER

## 2021-08-05 ASSESSMENT — MIFFLIN-ST. JEOR: SCORE: 1677.62

## 2021-08-05 ASSESSMENT — PAIN SCALES - GENERAL: PAINLEVEL: SEVERE PAIN (6)

## 2021-08-05 NOTE — PROGRESS NOTES
"    Assessment & Plan     Throat pain  Acute throat pain bed strep test is negative on rapid.  Will screen for Covid as well.  Patient will be notified of the results of both strep and Covid when available.  Advised patient to continue with symptomatic management.  Follow-up if symptoms are not improving.  - Streptococcus A Rapid Screen w/Reflex to PCR - Clinic Collect  - Symptomatic COVID-19 Virus (Coronavirus) by PCR Nasopharyngeal  - Group A Streptococcus PCR Throat Swab    Special screening for malignant neoplasms, colon  Order placed today.  Advised patient to return to clinic for preventative health.  - Adult Gastro Ref - Procedure Only; Future             Tobacco Cessation:   reports that he has never smoked. His smokeless tobacco use includes chew.      BMI:   Estimated body mass index is 27.41 kg/m  as calculated from the following:    Height as of this encounter: 1.778 m (5' 10\").    Weight as of this encounter: 86.6 kg (191 lb).           Return in about 4 weeks (around 9/2/2021) for Routine preventive, or sooner if symptoms fail to improve.    HARSH Rowe CNP  [facility]   Navdeep is a 61 year old who presents for the following health issues     HPI     Acute Illness  Acute illness concerns: pt is having some pain but mostly havign a hard time swallowing, SOB but has been an ongoing thing since last year along with weakness, brain fog  Onset/Duration: 1 day for throat pain rest of Sx's 1 year  Symptoms:  Fever: no  Chills/Sweats: no  Headache (location?): YES  Sinus Pressure: no  Conjunctivitis:  no  Ear Pain: no  Rhinorrhea: no  Congestion: no  Sore Throat: YES  Cough: no  Wheeze: no  Decreased Appetite: YES  Nausea: no  Vomiting: no  Diarrhea: no  Dysuria/Freq.: no  Dysuria or Hematuria: no  Fatigue/Achiness: YES  Sick/Strep Exposure: no  Therapies tried and outcome: tylenol not helping      He got the J&J vaccine early June. He has concerns of sore " "throat that worsened last night. He had enough pain that prevented him from sleeping well. He denies any specific nasal drainage. No cough has been persent but headache around entire head. He feels off. For the last year he has had SOB. He fears he may have long term covid. He had a negative covid antibody test in June.     Review of Systems   Constitutional, HEENT, cardiovascular, pulmonary, gi and gu systems are negative, except as otherwise noted.      Objective    /84   Pulse 93   Temp 99.4  F (37.4  C) (Tympanic)   Resp 14   Ht 1.778 m (5' 10\")   Wt 86.6 kg (191 lb)   SpO2 97%   BMI 27.41 kg/m    Body mass index is 27.41 kg/m .     Physical Exam   GENERAL: healthy, alert and no distress  EYES: Eyes grossly normal to inspection, PERRL and conjunctivae and sclerae normal  HENT: normal cephalic/atraumatic, ear canals and TM's normal, oral mucous membranes moist. Positive for, tonsillar erythema No tonsillar hypertrophy or tonsillar exudate.   NECK: slight cervical chain adenopathy present.  No asymmetry, masses, or scars and thyroid normal to palpation  RESP: lungs clear to auscultation - no rales, rhonchi or wheezes  CV: regular rate and rhythm, normal S1 S2, no S3 or S4, no murmur, click or rub, no peripheral edema and peripheral pulses strong  ABDOMEN: soft, nontender, no hepatosplenomegaly, no masses and bowel sounds normal  MS: no gross musculoskeletal defects noted, no edema                  "

## 2021-08-05 NOTE — PATIENT INSTRUCTIONS

## 2021-08-06 LAB
GROUP A STREP BY PCR: NOT DETECTED
SARS-COV-2 RNA RESP QL NAA+PROBE: NEGATIVE

## 2021-08-08 DIAGNOSIS — Z11.59 ENCOUNTER FOR SCREENING FOR OTHER VIRAL DISEASES: ICD-10-CM

## 2021-08-23 ENCOUNTER — MYC MEDICAL ADVICE (OUTPATIENT)
Dept: FAMILY MEDICINE | Facility: CLINIC | Age: 61
End: 2021-08-23

## 2021-09-18 ENCOUNTER — HEALTH MAINTENANCE LETTER (OUTPATIENT)
Age: 61
End: 2021-09-18

## 2021-09-20 ENCOUNTER — ANESTHESIA EVENT (OUTPATIENT)
Dept: GASTROENTEROLOGY | Facility: CLINIC | Age: 61
End: 2021-09-20
Payer: COMMERCIAL

## 2021-09-20 ENCOUNTER — LAB (OUTPATIENT)
Dept: LAB | Facility: CLINIC | Age: 61
End: 2021-09-20
Payer: COMMERCIAL

## 2021-09-20 DIAGNOSIS — Z11.59 ENCOUNTER FOR SCREENING FOR OTHER VIRAL DISEASES: ICD-10-CM

## 2021-09-20 PROCEDURE — U0005 INFEC AGEN DETEC AMPLI PROBE: HCPCS

## 2021-09-20 PROCEDURE — U0003 INFECTIOUS AGENT DETECTION BY NUCLEIC ACID (DNA OR RNA); SEVERE ACUTE RESPIRATORY SYNDROME CORONAVIRUS 2 (SARS-COV-2) (CORONAVIRUS DISEASE [COVID-19]), AMPLIFIED PROBE TECHNIQUE, MAKING USE OF HIGH THROUGHPUT TECHNOLOGIES AS DESCRIBED BY CMS-2020-01-R: HCPCS

## 2021-09-20 ASSESSMENT — ENCOUNTER SYMPTOMS: DYSRHYTHMIAS: 1

## 2021-09-20 NOTE — ANESTHESIA PREPROCEDURE EVALUATION
Anesthesia Pre-Procedure Evaluation    Patient: Navdeep Dunn   MRN: [id number] : 1960        Preoperative Diagnosis: Special screening for malignant neoplasm of colon [Z12.11]   Procedure : Procedure(s):  COLONOSCOPY     No past medical history on file.   No past surgical history on file.   Allergies   Allergen Reactions     Amoxicillin Hives      Social History     Tobacco Use     Smoking status: Never Smoker     Smokeless tobacco: Current User     Types: Chew   Substance Use Topics     Alcohol use: Not Currently      Wt Readings from Last 1 Encounters:   21 86.6 kg (191 lb)        Anesthesia Evaluation   Pt has had prior anesthetic. Type: General.    No history of anesthetic complications       ROS/MED HX  ENT/Pulmonary:  - neg pulmonary ROS     Neurologic:  - neg neurologic ROS     Cardiovascular:     (+) -----RIVERA. dysrhythmias, Irregular Heartbeat/Palpitations, Previous cardiac testing   Echo: Date: 20 Results:      The left ventricular ejection fraction at rest is 70%.     Left ventricle ejection fraction is normal. The calculated left ventricular ejection fraction is 70% without wall motion abnormality.    Normal right ventricular size and systolic function.    No significant valvular heart disease.    No previous study for comparison.     Stress Test: Date: 20 Results:   There is no prior study for comparison.     The nuclear stress test is negative for inducible myocardial ischemia or infarction.     The patient is at a low risk of future cardiac ischemic events.  ECG Reviewed:  Date: 20 Results:  Normal sinus rhythm   Nonspecific ST abnormality   Abnormal ECG   When compared with ECG of 2020 08:31,   No significant change was found   Cath:  Date: Results:      METS/Exercise Tolerance: >4 METS    Hematologic:  - neg hematologic  ROS     Musculoskeletal:  - neg musculoskeletal ROS     GI/Hepatic:  - neg GI/hepatic ROS     Renal/Genitourinary:  - neg Renal ROS     Endo:   - neg endo ROS     Psychiatric/Substance Use:  - neg psychiatric ROS     Infectious Disease:  - neg infectious disease ROS     Malignancy:       Other:  - neg other ROS          Physical Exam    Airway  airway exam normal      Mallampati: II   TM distance: > 3 FB   Neck ROM: full   Mouth opening: > 3 cm    Respiratory Devices and Support         Dental  no notable dental history         Cardiovascular   cardiovascular exam normal          Pulmonary   pulmonary exam normal                OUTSIDE LABS:  CBC:   Lab Results   Component Value Date    WBC 6.3 06/23/2020    WBC 10.5 06/20/2020    HGB 15.9 06/23/2020    HGB 14.6 06/20/2020    HCT 47.6 06/23/2020    HCT 44.2 06/20/2020     06/23/2020     06/20/2020     BMP:   Lab Results   Component Value Date     06/23/2020     06/20/2020    POTASSIUM 4.0 06/23/2020    POTASSIUM 3.8 06/20/2020    CHLORIDE 106 06/23/2020    CHLORIDE 106 06/20/2020    CO2 23 06/23/2020    CO2 25 06/20/2020    BUN 8 06/23/2020    BUN 10 06/20/2020    CR 0.84 06/23/2020    CR 0.95 06/20/2020     06/23/2020     (H) 06/20/2020     COAGS:   Lab Results   Component Value Date    PTT 27 06/23/2020    INR 0.99 06/23/2020     POC: No results found for: BGM, HCG, HCGS  HEPATIC:   Lab Results   Component Value Date    ALBUMIN 3.5 06/19/2020    PROTTOTAL 7.4 06/19/2020    ALT 30 06/19/2020    AST 19 06/19/2020    ALKPHOS 106 06/19/2020    BILITOTAL 0.3 06/19/2020     OTHER:   Lab Results   Component Value Date    LACT 1.8 06/20/2020    RAJEEV 9.4 06/23/2020    PHOS 3.1 06/20/2020    MAG 1.8 06/23/2020    CRP 0.3 06/20/2020    SED 16 06/07/2020       Anesthesia Plan    ASA Status:  1   NPO Status:  NPO Appropriate    Anesthesia Type: General.     - Airway: Native airway   Induction: Intravenous, Propofol.   Maintenance: TIVA.        Consents    Anesthesia Plan(s) and associated risks, benefits, and realistic alternatives discussed. Questions answered and  patient/representative(s) expressed understanding.     - Discussed with:  Patient      - Extended Intubation/Ventilatory Support Discussed: No.      - Patient is DNR/DNI Status: No    Use of blood products discussed: No .     Postoperative Care    Pain management: Oral pain medications.   PONV prophylaxis: Ondansetron (or other 5HT-3)     Comments:                Cornelius Madrid CRNA, APRN CRNA

## 2021-09-21 LAB — SARS-COV-2 RNA RESP QL NAA+PROBE: NEGATIVE

## 2021-09-23 ENCOUNTER — ANESTHESIA (OUTPATIENT)
Dept: GASTROENTEROLOGY | Facility: CLINIC | Age: 61
End: 2021-09-23
Payer: COMMERCIAL

## 2021-09-23 ENCOUNTER — HOSPITAL ENCOUNTER (OUTPATIENT)
Facility: CLINIC | Age: 61
Discharge: HOME | End: 2021-09-23
Attending: SURGERY | Admitting: SURGERY
Payer: COMMERCIAL

## 2021-09-23 VITALS
SYSTOLIC BLOOD PRESSURE: 144 MMHG | HEART RATE: 67 BPM | DIASTOLIC BLOOD PRESSURE: 89 MMHG | RESPIRATION RATE: 16 BRPM | WEIGHT: 185 LBS | BODY MASS INDEX: 26.48 KG/M2 | HEIGHT: 70 IN | OXYGEN SATURATION: 95 % | TEMPERATURE: 98.5 F

## 2021-09-23 LAB — COLONOSCOPY: NORMAL

## 2021-09-23 PROCEDURE — 258N000003 HC RX IP 258 OP 636: Performed by: SURGERY

## 2021-09-23 PROCEDURE — 250N000009 HC RX 250: Performed by: SURGERY

## 2021-09-23 PROCEDURE — G0121 COLON CA SCRN NOT HI RSK IND: HCPCS | Performed by: SURGERY

## 2021-09-23 PROCEDURE — 370N000017 HC ANESTHESIA TECHNICAL FEE, PER MIN: Performed by: SURGERY

## 2021-09-23 PROCEDURE — 250N000011 HC RX IP 250 OP 636: Performed by: NURSE ANESTHETIST, CERTIFIED REGISTERED

## 2021-09-23 PROCEDURE — 250N000009 HC RX 250: Performed by: NURSE ANESTHETIST, CERTIFIED REGISTERED

## 2021-09-23 PROCEDURE — 45378 DIAGNOSTIC COLONOSCOPY: CPT | Performed by: SURGERY

## 2021-09-23 RX ORDER — SODIUM CHLORIDE, SODIUM LACTATE, POTASSIUM CHLORIDE, CALCIUM CHLORIDE 600; 310; 30; 20 MG/100ML; MG/100ML; MG/100ML; MG/100ML
INJECTION, SOLUTION INTRAVENOUS CONTINUOUS
Status: DISCONTINUED | OUTPATIENT
Start: 2021-09-23 | End: 2021-09-23 | Stop reason: HOSPADM

## 2021-09-23 RX ORDER — PROPOFOL 10 MG/ML
INJECTION, EMULSION INTRAVENOUS PRN
Status: DISCONTINUED | OUTPATIENT
Start: 2021-09-23 | End: 2021-09-23

## 2021-09-23 RX ORDER — LIDOCAINE 40 MG/G
CREAM TOPICAL
Status: DISCONTINUED | OUTPATIENT
Start: 2021-09-23 | End: 2021-09-23 | Stop reason: HOSPADM

## 2021-09-23 RX ORDER — PROPOFOL 10 MG/ML
INJECTION, EMULSION INTRAVENOUS CONTINUOUS PRN
Status: DISCONTINUED | OUTPATIENT
Start: 2021-09-23 | End: 2021-09-23

## 2021-09-23 RX ORDER — ONDANSETRON 2 MG/ML
4 INJECTION INTRAMUSCULAR; INTRAVENOUS
Status: DISCONTINUED | OUTPATIENT
Start: 2021-09-23 | End: 2021-09-23 | Stop reason: HOSPADM

## 2021-09-23 RX ADMIN — LIDOCAINE HYDROCHLORIDE 0.1 ML: 10 INJECTION, SOLUTION EPIDURAL; INFILTRATION; INTRACAUDAL; PERINEURAL at 09:54

## 2021-09-23 RX ADMIN — PROPOFOL 200 MCG/KG/MIN: 10 INJECTION, EMULSION INTRAVENOUS at 10:51

## 2021-09-23 RX ADMIN — LIDOCAINE HYDROCHLORIDE 40 ML: 10 INJECTION, SOLUTION EPIDURAL; INFILTRATION; INTRACAUDAL; PERINEURAL at 10:51

## 2021-09-23 RX ADMIN — SODIUM CHLORIDE, POTASSIUM CHLORIDE, SODIUM LACTATE AND CALCIUM CHLORIDE: 600; 310; 30; 20 INJECTION, SOLUTION INTRAVENOUS at 09:54

## 2021-09-23 RX ADMIN — PROPOFOL 100 MG: 10 INJECTION, EMULSION INTRAVENOUS at 10:51

## 2021-09-23 ASSESSMENT — MIFFLIN-ST. JEOR: SCORE: 1650.4

## 2021-09-23 NOTE — ANESTHESIA CARE TRANSFER NOTE
Patient: Navdeep Dunn    Procedure(s):  COLONOSCOPY    Diagnosis: Special screening for malignant neoplasm of colon [Z12.11]  Diagnosis Additional Information: No value filed.    Anesthesia Type:   General     Note:    Oropharynx: oropharynx clear of all foreign objects and spontaneously breathing  Level of Consciousness: awake  Oxygen Supplementation: nasal cannula    Independent Airway: airway patency satisfactory and stable  Dentition: dentition unchanged  Vital Signs Stable: post-procedure vital signs reviewed and stable  Report to RN Given: handoff report given  Patient transferred to: Phase II          Vitals:  Vitals Value Taken Time   /89 09/23/21 1130   Temp     Pulse 67 09/23/21 1130   Resp 18 09/23/21 1115   SpO2 97 % 09/23/21 1134   Vitals shown include unvalidated device data.    Electronically Signed By: Yohan Hugo CRNA, APRN CRNA  September 23, 2021  11:35 AM

## 2021-09-23 NOTE — ANESTHESIA POSTPROCEDURE EVALUATION
Patient: Navdeep Dunn    Procedure(s):  COLONOSCOPY    Diagnosis:Special screening for malignant neoplasm of colon [Z12.11]  Diagnosis Additional Information: No value filed.    Anesthesia Type:  General    Note:  Disposition: Outpatient   Postop Pain Control: Uneventful            Sign Out: Well controlled pain   PONV: No   Neuro/Psych: Uneventful            Sign Out: Acceptable/Baseline neuro status   Airway/Respiratory: Uneventful            Sign Out: Acceptable/Baseline resp. status   CV/Hemodynamics: Uneventful            Sign Out: Acceptable CV status; No obvious hypovolemia; No obvious fluid overload   Other NRE: NONE   DID A NON-ROUTINE EVENT OCCUR? No           Last vitals:  Vitals Value Taken Time   /89 09/23/21 1130   Temp     Pulse 67 09/23/21 1130   Resp 18 09/23/21 1115   SpO2 97 % 09/23/21 1134   Vitals shown include unvalidated device data.    Electronically Signed By: Yohan Hugo CRNA, APRN CRNA  September 23, 2021  11:35 AM

## 2021-09-23 NOTE — H&P
"61 year old year old male here for colonoscopy for screening.    Patient Active Problem List   Diagnosis     Dizziness     Dyspnea on exertion     Abnormal resting ECG findings       History reviewed. No pertinent past medical history.    History reviewed. No pertinent surgical history.    @[facility]@    No current outpatient medications on file.       Allergies   Allergen Reactions     Amoxicillin Hives       Pt reports that he has never smoked. His smokeless tobacco use includes chew. He reports previous alcohol use. He reports that he does not use drugs.    Exam:  BP (!) 143/92 (BP Location: Left arm)   Pulse 85   Temp 98.5  F (36.9  C) (Oral)   Ht 1.778 m (5' 10\")   Wt 83.9 kg (185 lb)   SpO2 97%   BMI 26.54 kg/m      Awake, Alert OX3  Lungs - CTA bilaterally  CV - RRR, no murmurs, distal pulses intact  Abd - soft, non-distended, non-tender, +BS  Extr - No cyanosis or edema    A/P 61 year old year old male in need of colonoscopy for screening. Risks, benefits, alternatives, and complications were discussed including the possibility of perforation and the patient agreed to proceed    Dominic Bell MD     "

## 2021-09-23 NOTE — ADDENDUM NOTE
Addendum  created 09/23/21 1137 by Yohan Hugo APRN CRNA    Clinical Note Signed, Intraprocedure Event edited

## 2021-09-27 ENCOUNTER — TELEPHONE (OUTPATIENT)
Dept: FAMILY MEDICINE | Facility: CLINIC | Age: 61
End: 2021-09-27

## 2021-09-27 ENCOUNTER — HOSPITAL ENCOUNTER (EMERGENCY)
Facility: CLINIC | Age: 61
Discharge: HOME | End: 2021-09-27
Attending: NURSE PRACTITIONER | Admitting: NURSE PRACTITIONER
Payer: COMMERCIAL

## 2021-09-27 VITALS
SYSTOLIC BLOOD PRESSURE: 130 MMHG | BODY MASS INDEX: 26.48 KG/M2 | OXYGEN SATURATION: 97 % | RESPIRATION RATE: 18 BRPM | WEIGHT: 185 LBS | TEMPERATURE: 97.9 F | DIASTOLIC BLOOD PRESSURE: 83 MMHG | HEIGHT: 70 IN | HEART RATE: 86 BPM

## 2021-09-27 DIAGNOSIS — L03.90 CELLULITIS: ICD-10-CM

## 2021-09-27 PROCEDURE — 99283 EMERGENCY DEPT VISIT LOW MDM: CPT

## 2021-09-27 PROCEDURE — 99283 EMERGENCY DEPT VISIT LOW MDM: CPT | Performed by: NURSE PRACTITIONER

## 2021-09-27 RX ORDER — CEFDINIR 300 MG/1
300 CAPSULE ORAL 2 TIMES DAILY
Qty: 20 CAPSULE | Refills: 0 | Status: SHIPPED | OUTPATIENT
Start: 2021-09-27 | End: 2021-10-07

## 2021-09-27 RX ORDER — METRONIDAZOLE 500 MG/1
500 TABLET ORAL 2 TIMES DAILY
Qty: 20 TABLET | Refills: 0 | Status: SHIPPED | OUTPATIENT
Start: 2021-09-27 | End: 2021-10-07

## 2021-09-27 ASSESSMENT — MIFFLIN-ST. JEOR: SCORE: 1650.4

## 2021-09-27 NOTE — TELEPHONE ENCOUNTER
On Saturday, taking swim dock out, in water, found a leech on right 2nd toe that evening, removed and applied antibiotic ointment  Today toe is swollen, red, red line spreading up leg. Toe is painful.   No fever or chills.      Instructed patient needs to be seen, to go to ED now for further evaluation    Marianna MARIAN, RN

## 2021-09-27 NOTE — ED PROVIDER NOTES
History     Chief Complaint   Patient presents with     Cellulitis     right second toe, had leech on it over weekend, reports reddness starting today. Sent it to ED phone triage     HPI  Navdeep Dunn is a 61 year old male who presents to the emergency department with a right foot redness, swelling, tenderness, pruritus noted after removing a carmona this past Saturday.  [place].  Patient states approximately 4 hours later he noticed a tiny little carmona between his great toe and base of second phalanx (bite on medial side of second phalanx) of his right foot that he removed.  Patient states he did not think anything of it.  Patient stated that he noticed some mild itching and redness this morning.  Patient states he decided to come in to be evaluated and now reports he has noticed redness extending up his foot and increasing mild discomfort.  Patient denies fever, aches, chills, sweats, URI type symptoms including sore throat, otalgia, cough, congestion, abdominal pain, nausea, vomiting, diarrhea, bright red rectal bleeding, dysuria, hematuria, left or right-sided body weakness.  Patient admits to one episode of cellulitis in the past of which he took Keflex without complications.  Patient does state that he is allergic to amoxicillin.  Patient denies any other concerns today.    Allergies:  Allergies   Allergen Reactions     Amoxicillin Hives       Problem List:    Patient Active Problem List    Diagnosis Date Noted     Dyspnea on exertion      Priority: Medium     Abnormal resting ECG findings      Priority: Medium     Dizziness 06/19/2020     Priority: Medium        Past Medical History:    No past medical history on file.    Past Surgical History:    Past Surgical History:   Procedure Laterality Date     COLONOSCOPY N/A 9/23/2021    Procedure: COLONOSCOPY;  Surgeon: Dominic Bell MD;  Location: [facility]       Family History:    No  "family history on file.    Social History:  Marital Status:   [2]  Social History     Tobacco Use     Smoking status: Never Smoker     Smokeless tobacco: Current User     Types: Chew   Vaping Use     Vaping Use: Never used   Substance Use Topics     Alcohol use: Not Currently     Drug use: Never        Medications:    cefdinir (OMNICEF) 300 MG capsule  metroNIDAZOLE (FLAGYL) 500 MG tablet  nicotine (NICORETTE) 2 MG gum      Review of Systems  As mentioned above in the history present illness. All other systems were reviewed and are negative.    Physical Exam   BP: 130/83  Pulse: 86  Temp: 97.9  F (36.6  C)  Resp: 18  Height: 177.8 cm (5' 10\")  Weight: 83.9 kg (185 lb)  SpO2: 97 %      Physical Exam  Vitals and nursing note reviewed.   Constitutional:       General: He is not in acute distress.     Appearance: Normal appearance. He is well-developed. He is not ill-appearing, toxic-appearing or diaphoretic.   HENT:      Head: Normocephalic and atraumatic.      Right Ear: External ear normal.      Left Ear: External ear normal.      Nose: Nose normal.   Eyes:      General:         Right eye: No discharge.         Left eye: No discharge.      Conjunctiva/sclera: Conjunctivae normal.   Cardiovascular:      Rate and Rhythm: Normal rate and regular rhythm.      Heart sounds: Normal heart sounds. No murmur heard.   No friction rub. No gallop.    Pulmonary:      Effort: Pulmonary effort is normal.      Breath sounds: Normal breath sounds. No stridor. No wheezing.   Musculoskeletal:      Right foot: Swelling (with erythema noted between great toe and second phalanx at the base with some lymphangitis - see photo- no purulent drainage, weeping, abscess noted) present.   Skin:     General: Skin is warm.      Findings: No rash.   Neurological:      Mental Status: He is alert and oriented to person, place, and time.             ED Course        Procedures    No results found for this or any previous visit (from the past 24 " hour(s)).    Medications - No data to display    Assessments & Plan (with Medical Decision Making)  Navdeep Dunn is a 61 year old male who presents to the emergency department with a right foot redness, swelling, tenderness, pruritus noted after removing a carmona this past Saturday.  Patient reporting this morning noting increasing pain, pruritus, swelling, redness at the carmona bite with some red streaking up the top of his foot or dorsal side of his foot.  Patient denying fever, aches, chills, sweats, weeping, purulent drainage.  On exam there is no obvious abscess.  Patient is vitally stable.  Patient has no history of MRSA.  Patient does have an allergy to amoxicillin.  Due to [place] water and standing in mud and bleach bite will prescribe cefdinir and Flagyl for cellulitis coverage and recommend wound check in 2 days.  Discussed worrisome reasons to return.  Patient verbalized understanding and discharged in stable condition.     I have reviewed the nursing notes.    I have reviewed the findings, diagnosis, plan and need for follow up with the patient.    New Prescriptions    CEFDINIR (OMNICEF) 300 MG CAPSULE    Take 1 capsule (300 mg) by mouth 2 times daily for 10 days    METRONIDAZOLE (FLAGYL) 500 MG TABLET    Take 1 tablet (500 mg) by mouth 2 times daily for 10 days       Final diagnoses:   Cellulitis - [place] and carmona bite between medial aspect of second phalanx base       9/27/2021   [facility] EMERGENCY DEPT     Randall, Georgia Gomez, HARSH CNP  09/27/21 [id number]

## 2021-09-27 NOTE — DISCHARGE INSTRUCTIONS
Start cefdinir today and take twice daily for 10 days.  You may take this with or without food.  This generally is very well-tolerated.  I also want you to start metronidazole and take this twice daily as well.  This can cause some nausea.  You should take this with some food.

## 2022-01-08 ENCOUNTER — HEALTH MAINTENANCE LETTER (OUTPATIENT)
Age: 62
End: 2022-01-08

## 2022-03-25 ENCOUNTER — OFFICE VISIT (OUTPATIENT)
Dept: FAMILY MEDICINE | Facility: CLINIC | Age: 62
End: 2022-03-25
Payer: COMMERCIAL

## 2022-03-25 VITALS
HEIGHT: 70 IN | HEART RATE: 89 BPM | SYSTOLIC BLOOD PRESSURE: 132 MMHG | BODY MASS INDEX: 25.57 KG/M2 | RESPIRATION RATE: 16 BRPM | DIASTOLIC BLOOD PRESSURE: 82 MMHG | WEIGHT: 178.6 LBS | TEMPERATURE: 97.8 F | OXYGEN SATURATION: 97 %

## 2022-03-25 DIAGNOSIS — Z13.220 SCREENING FOR HYPERLIPIDEMIA: ICD-10-CM

## 2022-03-25 DIAGNOSIS — R63.4 UNINTENTIONAL WEIGHT LOSS: ICD-10-CM

## 2022-03-25 DIAGNOSIS — R42 DIZZINESS: ICD-10-CM

## 2022-03-25 DIAGNOSIS — R06.02 SOB (SHORTNESS OF BREATH): ICD-10-CM

## 2022-03-25 DIAGNOSIS — E11.9 TYPE 2 DIABETES MELLITUS WITHOUT COMPLICATION, WITHOUT LONG-TERM CURRENT USE OF INSULIN (H): ICD-10-CM

## 2022-03-25 DIAGNOSIS — L20.82 FLEXURAL ECZEMA: ICD-10-CM

## 2022-03-25 DIAGNOSIS — Z11.4 SCREENING FOR HIV (HUMAN IMMUNODEFICIENCY VIRUS): ICD-10-CM

## 2022-03-25 DIAGNOSIS — Z11.59 NEED FOR HEPATITIS C SCREENING TEST: ICD-10-CM

## 2022-03-25 DIAGNOSIS — Z00.00 ROUTINE GENERAL MEDICAL EXAMINATION AT A HEALTH CARE FACILITY: Primary | ICD-10-CM

## 2022-03-25 LAB
ALBUMIN SERPL-MCNC: 3.8 G/DL (ref 3.4–5)
ALP SERPL-CCNC: 109 U/L (ref 40–150)
ALT SERPL W P-5'-P-CCNC: 31 U/L (ref 0–70)
ANION GAP SERPL CALCULATED.3IONS-SCNC: 9 MMOL/L (ref 3–14)
AST SERPL W P-5'-P-CCNC: 28 U/L (ref 0–45)
BILIRUB SERPL-MCNC: 0.7 MG/DL (ref 0.2–1.3)
BUN SERPL-MCNC: 11 MG/DL (ref 7–30)
CALCIUM SERPL-MCNC: 8.8 MG/DL (ref 8.5–10.1)
CHLORIDE BLD-SCNC: 103 MMOL/L (ref 94–109)
CHOLEST SERPL-MCNC: 244 MG/DL
CO2 SERPL-SCNC: 23 MMOL/L (ref 20–32)
CREAT SERPL-MCNC: 0.7 MG/DL (ref 0.66–1.25)
ERYTHROCYTE [DISTWIDTH] IN BLOOD BY AUTOMATED COUNT: 13.3 % (ref 10–15)
FASTING STATUS PATIENT QL REPORTED: NO
GFR SERPL CREATININE-BSD FRML MDRD: >90 ML/MIN/1.73M2
GLUCOSE BLD-MCNC: 358 MG/DL (ref 70–99)
HCT VFR BLD AUTO: 47 % (ref 40–53)
HDLC SERPL-MCNC: 32 MG/DL
HGB BLD-MCNC: 16.4 G/DL (ref 13.3–17.7)
LDLC SERPL CALC-MCNC: 163 MG/DL
LDLC SERPL CALC-MCNC: ABNORMAL MG/DL
MCH RBC QN AUTO: 30.9 PG (ref 26.5–33)
MCHC RBC AUTO-ENTMCNC: 34.9 G/DL (ref 31.5–36.5)
MCV RBC AUTO: 89 FL (ref 78–100)
NONHDLC SERPL-MCNC: 212 MG/DL
PLATELET # BLD AUTO: 220 10E3/UL (ref 150–450)
POTASSIUM BLD-SCNC: 3.9 MMOL/L (ref 3.4–5.3)
PROT SERPL-MCNC: 7.6 G/DL (ref 6.8–8.8)
RBC # BLD AUTO: 5.3 10E6/UL (ref 4.4–5.9)
SODIUM SERPL-SCNC: 135 MMOL/L (ref 133–144)
TRIGL SERPL-MCNC: 460 MG/DL
TSH SERPL DL<=0.005 MIU/L-ACNC: 1.21 MU/L (ref 0.4–4)
WBC # BLD AUTO: 9.2 10E3/UL (ref 4–11)

## 2022-03-25 PROCEDURE — 87389 HIV-1 AG W/HIV-1&-2 AB AG IA: CPT | Performed by: NURSE PRACTITIONER

## 2022-03-25 PROCEDURE — 36415 COLL VENOUS BLD VENIPUNCTURE: CPT | Performed by: NURSE PRACTITIONER

## 2022-03-25 PROCEDURE — 83721 ASSAY OF BLOOD LIPOPROTEIN: CPT | Mod: 59 | Performed by: NURSE PRACTITIONER

## 2022-03-25 PROCEDURE — 82306 VITAMIN D 25 HYDROXY: CPT | Performed by: NURSE PRACTITIONER

## 2022-03-25 PROCEDURE — 99396 PREV VISIT EST AGE 40-64: CPT | Performed by: NURSE PRACTITIONER

## 2022-03-25 PROCEDURE — 84443 ASSAY THYROID STIM HORMONE: CPT | Performed by: NURSE PRACTITIONER

## 2022-03-25 PROCEDURE — 99214 OFFICE O/P EST MOD 30 MIN: CPT | Mod: 25 | Performed by: NURSE PRACTITIONER

## 2022-03-25 PROCEDURE — 85027 COMPLETE CBC AUTOMATED: CPT | Performed by: NURSE PRACTITIONER

## 2022-03-25 PROCEDURE — 80061 LIPID PANEL: CPT | Performed by: NURSE PRACTITIONER

## 2022-03-25 PROCEDURE — 80053 COMPREHEN METABOLIC PANEL: CPT | Performed by: NURSE PRACTITIONER

## 2022-03-25 PROCEDURE — 86803 HEPATITIS C AB TEST: CPT | Performed by: NURSE PRACTITIONER

## 2022-03-25 RX ORDER — TRIAMCINOLONE ACETONIDE 1 MG/G
CREAM TOPICAL 2 TIMES DAILY
Qty: 45 G | Refills: 1 | Status: SHIPPED | OUTPATIENT
Start: 2022-03-25

## 2022-03-25 ASSESSMENT — ENCOUNTER SYMPTOMS
COUGH: 0
SHORTNESS OF BREATH: 1
CHILLS: 0
HEMATOCHEZIA: 0
FEVER: 0
ABDOMINAL PAIN: 0
ARTHRALGIAS: 0
HEARTBURN: 0
CONSTIPATION: 0
FREQUENCY: 0
HEADACHES: 0
SORE THROAT: 0
PALPITATIONS: 0
NERVOUS/ANXIOUS: 0
EYE PAIN: 0
HEMATURIA: 0
WEAKNESS: 1
DYSURIA: 0
MYALGIAS: 0
NAUSEA: 0
PARESTHESIAS: 1
JOINT SWELLING: 0
DIARRHEA: 0

## 2022-03-25 ASSESSMENT — PAIN SCALES - GENERAL: PAINLEVEL: NO PAIN (0)

## 2022-03-25 NOTE — PROGRESS NOTES
"SUBJECTIVE:   CC: Navdeep Dunn is an 61 year old male who presents for preventative health visit.       Patient has been advised of split billing requirements and indicates understanding: Yes     Generally feels \"blah\". He is looking towards residential from his current job and working in another job. Prior to leaving his job he is here to get up to date on his healthcare screenings before leaving his place of employment. Patient has noticed increased occurrence of dizziness when walking his dog in a figure 8 motion and other occasional occurences. Patient also feels he experiences SOB when walking short distances which he normally did not have trouble with.  Verbalizes that he has stopped drinking since April of 2006 and is wondering if there are any effects from his drinking.  Patient has noticed an increase in his eczema rash and is looking for symptomatic management.     Healthy Habits:     Getting at least 3 servings of Calcium per day:  Yes    Bi-annual eye exam:  Yes    Dental care twice a year:  NO    Sleep apnea or symptoms of sleep apnea:  Daytime drowsiness    Diet:  Regular (no restrictions)    Frequency of exercise:  None    Taking medications regularly:  Yes    Medication side effects:  Not applicable    PHQ-2 Total Score: 0    Additional concerns today:  Yes    Chief Complaint   Patient presents with     Physical     Derm Problem     Dizziness     Shortness of Breath     Ongoing since early 2020; patient indicates he was sick just prior to the COVID 19 pandemic and thinks he had it       Dizziness  Onset: 4 weeks; intermittent    Description:   Do you feel faint:  no   Does it feel like the surroundings (bed, room) are moving: no   Unsteady/off balance: YES  Have you passed out or fallen: no     Intensity: moderate    Progression of Symptoms:  same and intermittent    Accompanying Signs & Symptoms:  Heart palpitations: no   Nausea, vomiting: no   Weakness in arms or legs: YES  Fatigue: YES  Vision " or speech changes: no   Ringing in ears (Tinnitus): YES- ongoing x40 years  Hearing Loss: YES- decreased hearing in the right ear; not a new symptom    History:   Head trauma/concussion hx: no   Previous similar symptoms: no   Recent bleeding history: no     Precipitating factors:   Worse with activity or head movement: YES  Any new medications (BP?): no   Alcohol/drug abuse/withdrawal: no     Alleviating factors:   Does staying in a fixed position give relief:  YES  Therapies Tried and outcome: none    Rash  Onset: Ongoing for several years but worse this winter    Description:   Location: right hand, bilateral underarm and right leg  Character: round, blotchy, raised, flakey, painful, red  Itching (Pruritis): YES    Progression of Symptoms:  worsening    Accompanying Signs & Symptoms:  Fever: no   Body aches or joint pain: no   Sore throat symptoms: no   Recent cold symptoms: no     History:   Previous similar rash: YES    Precipitating factors:   Exposure to similar rash: no   New exposures: None   Recent travel: no     Alleviating factors:  none  Therapies Tried and outcome: Betamethasone has helped in the past    Today's PHQ-2 Score:   PHQ-2 ( 1999 Pfizer) 3/25/2022   Q1: Little interest or pleasure in doing things 0   Q2: Feeling down, depressed or hopeless 0   PHQ-2 Score 0   PHQ-2 Total Score (12-17 Years)- Positive if 3 or more points; Administer PHQ-A if positive -   Q1: Little interest or pleasure in doing things Not at all   Q2: Feeling down, depressed or hopeless Not at all   PHQ-2 Score 0       Abuse: Current or Past(Physical, Sexual or Emotional)- No  Do you feel safe in your environment? Yes    Have you ever done Advance Care Planning? (For example, a Health Directive, POLST, or a discussion with a medical provider or your loved ones about your wishes): No, advance care planning information given to patient to review.  Patient plans to discuss their wishes with loved ones or provider.      Social  History     Tobacco Use     Smoking status: Never Smoker     Smokeless tobacco: Current User     Types: Chew   Substance Use Topics     Alcohol use: Not Currently     If you drink alcohol do you typically have >3 drinks per day or >7 drinks per week? No    Alcohol Use 3/25/2022   Prescreen: >3 drinks/day or >7 drinks/week? No   Prescreen: >3 drinks/day or >7 drinks/week? -       Last PSA:   Prostate Specific Antigen Screen   Date Value Ref Range Status   07/02/2018 0.6 0.0 - 3.5 ng/mL Final       Reviewed orders with patient. Reviewed health maintenance and updated orders accordingly - Yes  BP Readings from Last 3 Encounters:   03/25/22 132/82   09/27/21 130/83   09/23/21 (!) 144/89    Wt Readings from Last 3 Encounters:   03/25/22 81 kg (178 lb 9.6 oz)   09/27/21 83.9 kg (185 lb)   09/23/21 83.9 kg (185 lb)                  Patient Active Problem List   Diagnosis     Dizziness     Dyspnea on exertion     Abnormal resting ECG findings     Past Surgical History:   Procedure Laterality Date     COLONOSCOPY N/A 9/23/2021    Procedure: COLONOSCOPY;  Surgeon: Dominic Bell MD;  Location: [facility]       Social History     Tobacco Use     Smoking status: Never Smoker     Smokeless tobacco: Current User     Types: Chew   Substance Use Topics     Alcohol use: Not Currently     History reviewed. No pertinent family history.      Current Outpatient Medications   Medication Sig Dispense Refill     nicotine (NICORETTE) 2 MG gum Take 2 mg by mouth       triamcinolone (KENALOG) 0.1 % external cream Apply topically 2 times daily 45 g 1     Allergies   Allergen Reactions     Amoxicillin Hives       Reviewed and updated as needed this visit by clinical staff   Tobacco  Allergies  Meds  Problems  Med Hx  Surg Hx  Fam Hx  Soc   Hx        Reviewed and updated as needed this visit by Provider   Tobacco  Allergies  Meds  Problems  Med Hx  Surg Hx  Fam Hx         History reviewed. No pertinent past medical history.   Past  "Surgical History:   Procedure Laterality Date     COLONOSCOPY N/A 9/23/2021    Procedure: COLONOSCOPY;  Surgeon: Dominic Bell MD;  Location: [facility]       Review of Systems   Constitutional: Negative for chills and fever.   HENT: Positive for hearing loss. Negative for congestion, ear pain and sore throat.    Eyes: Negative for pain and visual disturbance.   Respiratory: Positive for shortness of breath. Negative for cough.    Cardiovascular: Negative for chest pain, palpitations and peripheral edema.   Gastrointestinal: Negative for abdominal pain, constipation, diarrhea, heartburn, hematochezia and nausea.   Genitourinary: Negative for dysuria, frequency, hematuria, impotence, penile discharge and urgency.   Musculoskeletal: Negative for arthralgias, joint swelling and myalgias.   Neurological: Positive for weakness and paresthesias. Negative for headaches.   Psychiatric/Behavioral: Negative for mood changes. The patient is not nervous/anxious.        OBJECTIVE:   /82 (BP Location: Right arm, Patient Position: Sitting, Cuff Size: Adult Regular)   Pulse 89   Temp 97.8  F (36.6  C) (Tympanic)   Resp 16   Ht 1.778 m (5' 10\")   Wt 81 kg (178 lb 9.6 oz)   SpO2 97%   BMI 25.63 kg/m      Physical Exam  GENERAL: healthy, alert and no distress  NECK: no adenopathy, no asymmetry, masses, or scars and thyroid normal to palpation  RESP: lungs clear to auscultation - no rales, rhonchi or wheezes  CV: regular rate and rhythm, normal S1 S2, no S3 or S4, no murmur, click or rub, no peripheral edema and peripheral pulses strong  ABDOMEN: soft, nontender, no hepatosplenomegaly, no masses and bowel sounds normal  MS: no gross musculoskeletal defects noted, no edema      ASSESSMENT/PLAN:   (Z00.00) Routine general medical examination at a health care facility  (primary encounter diagnosis  Healthy Male exam completed today.  I discussed preventative measures with the patient including continuing with lifestyle " modifications of a balanced diet and regular cardiovascular exercise.  I discussed self testicular exams and how to complete these.  I encouraged patient to follow-up yearly for annual physicals and sooner as needed.       (L20.82) Flexural eczema  Comment: Eczema flair noted on both hands and right leg.  Patient also describes it on his back and under arms. Has had this in the past and resolves during the summer months. Prescription given for Kenalog cream given. Encouraged to try an OTC allergy medication for symptomatic management.  Plan: triamcinolone (KENALOG) 0.1 % external cream          (R42) Dizziness  Comment: Screened negative for postural vertigo was negative.  Labs collected will follow up with results.   Plan: CBC with platelets, TSH with free T4 reflex,         Vitamin D Deficiency      (Z11.4) Screening for HIV (human immunodeficiency virus)  (Z11.59) Need for hepatitis C screening test  Comment: Preventative screening completed today.   Plan: HIV Antigen Antibody Combo         Hepatitis C Screen Reflex to HCV RNA Quant and         Genotype          (R06.02) SOB (shortness of breath)  Comment: Reviewed need for continued conditioning exercises. Etiology ultimately unclear. Labs will be completed to assess for other possible causes. Could consider chest xray to evaluate further.   Plan: Follow up if condition worsens    (R63.4) Unintentional weight loss  Comment: Labs collected.  Will follow up with results.   Plan: Comprehensive metabolic panel (BMP + Alb, Alk         Phos, ALT, AST, Total. Bili, TP), CBC with         platelets, TSH with free T4 reflex, Vitamin D          Deficiency    (Z13.220) Screening for hyperlipidemia  Comment: three years since last screening. Labs collected, will follow up with results.   Plan: Lipid panel reflex to direct LDL Non-fasting    Patient has been advised of split billing requirements and indicates understanding: Yes    COUNSELING:   Reviewed preventive health  "counseling, as reflected in patient instructions       Regular exercise       Healthy diet/nutrition       HIV screeninx in teen years, 1x in adult years, and at intervals if high risk    Estimated body mass index is 25.63 kg/m  as calculated from the following:    Height as of this encounter: 1.778 m (5' 10\").    Weight as of this encounter: 81 kg (178 lb 9.6 oz).         He reports that he has never smoked. His smokeless tobacco use includes chew.  Tobacco Cessation Action Plan:   Information offered: Patient not interested at this time      Counseling Resources:  ATP IV Guidelines  Pooled Cohorts Equation Calculator  FRAX Risk Assessment  ICSI Preventive Guidelines  Dietary Guidelines for Americans, 2010  USDA's MyPlate  ASA Prophylaxis  Lung CA Screening    HARSH Rowe CNP [facility]    I agree with the above evaluation and recommendations. Pt was seen by CNM student. I was present for the evaluation and plan development with the patient. Additional questions were answered as needed. HARSH Rowe CNP, ILEANA Bello/Scribe  "

## 2022-03-28 ENCOUNTER — OFFICE VISIT (OUTPATIENT)
Dept: FAMILY MEDICINE | Facility: CLINIC | Age: 62
End: 2022-03-28
Payer: COMMERCIAL

## 2022-03-28 VITALS
BODY MASS INDEX: 25.48 KG/M2 | RESPIRATION RATE: 18 BRPM | HEIGHT: 70 IN | TEMPERATURE: 97.2 F | WEIGHT: 178 LBS | SYSTOLIC BLOOD PRESSURE: 136 MMHG | OXYGEN SATURATION: 99 % | DIASTOLIC BLOOD PRESSURE: 82 MMHG | HEART RATE: 76 BPM

## 2022-03-28 DIAGNOSIS — R73.9 HYPERGLYCEMIA: Primary | ICD-10-CM

## 2022-03-28 DIAGNOSIS — E11.9 TYPE 2 DIABETES MELLITUS WITHOUT COMPLICATION, WITHOUT LONG-TERM CURRENT USE OF INSULIN (H): ICD-10-CM

## 2022-03-28 LAB
DEPRECATED CALCIDIOL+CALCIFEROL SERPL-MC: 14 UG/L (ref 20–75)
FASTING STATUS PATIENT QL REPORTED: YES
GLUCOSE BLD-MCNC: 356 MG/DL (ref 70–99)
HBA1C MFR BLD: 14.6 % (ref 0–5.6)
HCV AB SERPL QL IA: NONREACTIVE
HIV 1+2 AB+HIV1 P24 AG SERPL QL IA: NONREACTIVE

## 2022-03-28 PROCEDURE — 83036 HEMOGLOBIN GLYCOSYLATED A1C: CPT | Performed by: FAMILY MEDICINE

## 2022-03-28 PROCEDURE — 99214 OFFICE O/P EST MOD 30 MIN: CPT | Performed by: FAMILY MEDICINE

## 2022-03-28 PROCEDURE — 36415 COLL VENOUS BLD VENIPUNCTURE: CPT | Performed by: FAMILY MEDICINE

## 2022-03-28 PROCEDURE — 82947 ASSAY GLUCOSE BLOOD QUANT: CPT | Performed by: FAMILY MEDICINE

## 2022-03-28 RX ORDER — GLUCOSAMINE HCL/CHONDROITIN SU 500-400 MG
CAPSULE ORAL
Qty: 100 EACH | Refills: 3 | Status: SHIPPED | OUTPATIENT
Start: 2022-03-28

## 2022-03-28 RX ORDER — LANCETS
EACH MISCELLANEOUS
Qty: 100 EACH | Refills: 6 | Status: SHIPPED | OUTPATIENT
Start: 2022-03-28

## 2022-03-28 RX ORDER — METFORMIN HCL 500 MG
500 TABLET, EXTENDED RELEASE 24 HR ORAL
Qty: 30 TABLET | Refills: 0 | Status: SHIPPED | OUTPATIENT
Start: 2022-03-28 | End: 2022-04-25

## 2022-03-28 ASSESSMENT — PAIN SCALES - GENERAL: PAINLEVEL: NO PAIN (0)

## 2022-03-28 NOTE — PROGRESS NOTES
Assessment & Plan     Hyperglycemia  - Hemoglobin A1c  - Glucose  - Hemoglobin A1c  - Glucose    Type 2 diabetes mellitus without complication, without long-term current use of insulin (H)  I only started him on metformin for now because it is unclear how high his EAG is, the 350s he previously had checked was nonfasting.  I reviewed with him a way to check BGs at home that will check daily AM and rotating meals so he only has to check BID for now.  - declines statin for now which is very reasonable since he doesn't want to change too many things at once, but we did education re:indication for it and he is willing to try at a later date  - metFORMIN (GLUCOPHAGE-XR) 500 MG 24 hr tablet  Dispense: 30 tablet; Refill: 0  - AMB Adult Diabetes Educator Referral  - blood glucose monitoring (NO BRAND SPECIFIED) meter device kit  Dispense: 1 kit; Refill: 0  - blood glucose (NO BRAND SPECIFIED) test strip  Dispense: 100 strip; Refill: 6  - thin (NO BRAND SPECIFIED) lancets  Dispense: 100 each; Refill: 6  - alcohol swab prep pads  Dispense: 100 each; Refill: 3  - Glucose  - Glucose    Patient Instructions   - Get your glucometer supplies and bring them back here to have one of our nurses show you how to use it (make a nurse-only appointment)  - Check your blood sugars the way I drew out for you  - Make an appointment with the Diabetes Education program for more thorough education and understanding  - Follow up with Edith Hay in about 2-4 weeks for a Diabetes follow-up    Return if symptoms worsen or fail to improve, for Follow up diabetes.    Meli Manzano MD  [facility]   Curtis is a 61 year old who presents for the following health issues  accompanied by his self.    History of Present Illness       Reason for visit:  Review lab results  Symptom onset:  More than a month  Symptoms include:  1) shortness of breath  Symptom intensity:  Moderate  Symptom progression:  Staying  the same  Had these symptoms before:  Yes  Has tried/received treatment for these symptoms:  No    He eats 2-3 servings of fruits and vegetables daily.He consumes 6 sweetened beverage(s) daily.He exercises with enough effort to increase his heart rate 9 or less minutes per day.  He exercises with enough effort to increase his heart rate 3 or less days per week.   He is taking medications regularly.     Chief Complaint   Patient presents with     Results     of blood test      Component      Latest Ref Rng & Units 3/25/2022   Sodium      133 - 144 mmol/L 135   Potassium      3.4 - 5.3 mmol/L 3.9   Chloride      94 - 109 mmol/L 103   Carbon Dioxide      20 - 32 mmol/L 23   Anion Gap      3 - 14 mmol/L 9   Urea Nitrogen      7 - 30 mg/dL 11   Creatinine      0.66 - 1.25 mg/dL 0.70   Calcium      8.5 - 10.1 mg/dL 8.8   Glucose      70 - 99 mg/dL 358 (H)   Alkaline Phosphatase      40 - 150 U/L 109   AST      0 - 45 U/L 28   ALT      0 - 70 U/L 31   Protein Total      6.8 - 8.8 g/dL 7.6   Albumin      3.4 - 5.0 g/dL 3.8   Bilirubin Total      0.2 - 1.3 mg/dL 0.7   GFR Estimate      >60 mL/min/1.73m2 >90   WBC      4.0 - 11.0 10e3/uL 9.2   RBC Count      4.40 - 5.90 10e6/uL 5.30   Hemoglobin      13.3 - 17.7 g/dL 16.4   Hematocrit      40.0 - 53.0 % 47.0   MCV      78 - 100 fL 89   MCH      26.5 - 33.0 pg 30.9   MCHC      31.5 - 36.5 g/dL 34.9   RDW      10.0 - 15.0 % 13.3   Platelet Count      150 - 450 10e3/uL 220   Cholesterol      <200 mg/dL 244 (H)   Triglycerides      <150 mg/dL 460 (H)   HDL Cholesterol      >=40 mg/dL 32 (L)   LDL Cholesterol Calculated          Non HDL Cholesterol      <130 mg/dL 212 (H)   Patient Fasting > 8hrs?       No   TSH      0.40 - 4.00 mU/L 1.21   LDL Cholesterol Direct      <100 mg/dL 163 (H)     Has been feeling pretty down for a few months  Got screenings bc he hadn't been feeling well. Saw the high blood sugar and decided to be seen.  His dad had diabetes    Review of Systems  "  Constitutional, HEENT, cardiovascular, pulmonary, gi and gu systems are negative, except as otherwise noted.      Objective    /82   Pulse 76   Temp 97.2  F (36.2  C) (Tympanic)   Resp 18   Ht 1.778 m (5' 10\")   Wt 80.7 kg (178 lb)   SpO2 99%   BMI 25.54 kg/m    Body mass index is 25.54 kg/m .  Physical Exam   GENERAL: healthy, alert and no distress  RESP: normal respiratory effort, speaking in complete sentences  MS: no gross musculoskeletal defects noted, no edema  PSYCH: mentation appears normal, affect normal/bright            "

## 2022-03-28 NOTE — PATIENT INSTRUCTIONS
- Get your glucometer supplies and bring them back here to have one of our nurses show you how to use it (make a nurse-only appointment)  - Check your blood sugars the way I drew out for you  - Make an appointment with the Diabetes Education program for more thorough education and understanding  - Follow up with Edtih Hay in about 2-4 weeks for a Diabetes follow-up

## 2022-03-29 ENCOUNTER — ALLIED HEALTH/NURSE VISIT (OUTPATIENT)
Dept: FAMILY MEDICINE | Facility: CLINIC | Age: 62
End: 2022-03-29
Payer: COMMERCIAL

## 2022-03-29 DIAGNOSIS — E11.9 DIABETES MELLITUS, TYPE 2 (H): Primary | ICD-10-CM

## 2022-03-29 PROCEDURE — 99207 PR NO CHARGE NURSE ONLY: CPT

## 2022-03-29 NOTE — PROGRESS NOTES
RN provided education to patient on using his one touch glucometer kit, Patient able to give return demonstration and got a reading B with one dose of metformin from last night. RN provided education and training on using his trulicity pen as well. Patient returned demonstration and gave himself the injection and will now be doing Trulicity every Tuesday. Patient does have follow up scheduled with Provider Edith Hay for diabetes next week and has a diabetic education with our educator on . Spent 45mins with patient and reviewing and going over his questions. RN also printed off Trulicity education, Metformin education and an overview of diabetes education packet from [employer].     Elsie Sandoval RN BSN PHN

## 2022-04-07 ENCOUNTER — OFFICE VISIT (OUTPATIENT)
Dept: FAMILY MEDICINE | Facility: CLINIC | Age: 62
End: 2022-04-07
Payer: COMMERCIAL

## 2022-04-07 VITALS
OXYGEN SATURATION: 98 % | DIASTOLIC BLOOD PRESSURE: 88 MMHG | RESPIRATION RATE: 17 BRPM | WEIGHT: 177.4 LBS | HEART RATE: 80 BPM | TEMPERATURE: 97.6 F | BODY MASS INDEX: 25.4 KG/M2 | HEIGHT: 70 IN | SYSTOLIC BLOOD PRESSURE: 142 MMHG

## 2022-04-07 DIAGNOSIS — E11.9 TYPE 2 DIABETES MELLITUS WITHOUT COMPLICATION, WITHOUT LONG-TERM CURRENT USE OF INSULIN (H): Primary | ICD-10-CM

## 2022-04-07 PROCEDURE — 99215 OFFICE O/P EST HI 40 MIN: CPT | Performed by: NURSE PRACTITIONER

## 2022-04-07 RX ORDER — ATORVASTATIN CALCIUM 20 MG/1
20 TABLET, FILM COATED ORAL DAILY
Qty: 90 TABLET | Refills: 3 | Status: CANCELLED | OUTPATIENT
Start: 2022-04-07

## 2022-04-07 NOTE — PROGRESS NOTES
Assessment & Plan     Type 2 diabetes mellitus without complication, without long-term current use of insulin (H)  Newly diagnosed type II diabetic A1c was 14.6%.  He has started monitoring his blood sugars he is currently taking Metformin and Trulicity.  In general he is doing well with changes.  Discussed nutrition/dietary recommendations with the patient.  I recommended that he increase his protein intake to improve his caloric intake overall.      Review of prior external note(s) from - family medicine. Reviewed labs from last visit.   I spent a total of 41 minutes on the day of the visit.   Time spent doing chart review, history and exam, documentation and further activities per the note       Patient Instructions   Healthy For Life --- Dr. Jose Medellin    [email or web address]      Carbs: Okay to keep it around 100 carb.   Increase your Protien with a goal of matching carb intake.         Return in about 3 months (around 7/7/2022) for Diabetes Follow up.    HARSH Rowe [facility]   Curtis is a 61 year old who presents for the following health issues;    HPI     Diabetes Follow-up    How often are you checking your blood sugar? Two times daily  Blood sugar testing frequency justification:  Uncontrolled diabetes  What time of day are you checking your blood sugars (select all that apply)?  in the morning and before dinner   Have you had any blood sugars above 200?  Yes 273  Have you had any blood sugars below 70?  No    What symptoms do you notice when your blood sugar is low?  Blurred vision all the time     What concerns do you have today about your diabetes? None     Do you have any of these symptoms? (Select all that apply)  Blurry vision    Have you had a diabetic eye exam in the last 12 months? No    Patient has the one touch ultra 2.     He is a newly diagnosed diabetic. He is metformin and trulicity. He  "has noticed blurriness of vision and progression / reduction of his blood glucose.     He is currently feeling not quite right but he is doing well.   He is confused on how much he should be eating carbs. He has noticed when he has eaten 100carbs per day and is concerned about calorie intake.   He is incorporating diets.       BP Readings from Last 2 Encounters:   04/07/22 (!) 142/88   03/28/22 136/82     Hemoglobin A1C (%)   Date Value   03/28/2022 14.6 (H)     LDL Cholesterol Calculated   Date Value   03/25/2022      Comment:     Cannot estimate LDL when triglyceride exceeds 400 mg/dL   07/02/2018 141 mg/dL (H)     LDL Cholesterol Direct (mg/dL)   Date Value   03/25/2022 163 (H)                 How many servings of fruits and vegetables do you eat daily?  2-3    On average, how many sweetened beverages do you drink each day (Examples: soda, juice, sweet tea, etc.  Do NOT count diet or artificially sweetened beverages)?   0    How many days per week do you exercise enough to make your heart beat faster? 3 or less    How many minutes a day do you exercise enough to make your heart beat faster? 9 or less    How many days per week do you miss taking your medication? 0        Review of Systems   Constitutional, HEENT, cardiovascular, pulmonary, gi and gu systems are negative, except as otherwise noted.      Objective    BP (!) 142/88   Pulse 80   Temp 97.6  F (36.4  C) (Tympanic)   Resp 17   Ht 1.778 m (5' 10\")   Wt 80.5 kg (177 lb 6.4 oz)   SpO2 98%   BMI 25.45 kg/m    Body mass index is 25.45 kg/m .  Physical Exam   GENERAL: healthy, alert and no distress  Diabetic foot exam: normal DP and PT pulses, no trophic changes or ulcerative lesions and normal sensory exam          The 10-year ASCVD risk score ([place] VALENCIA Jr., et al., 2013) is: 31.3%    Values used to calculate the score:      Age: 61 years      Sex: Male      Is Non- : No      Diabetic: Yes      Tobacco smoker: No      Systolic " Blood Pressure: 142 mmHg      Is BP treated: No      HDL Cholesterol: 32 mg/dL      Total Cholesterol: 244 mg/dL

## 2022-04-07 NOTE — PATIENT INSTRUCTIONS
Healthy For Life --- Dr. Jose Medellin    [email or web address]      Carbs: Okay to keep it around 100 carb.   Increase your Protien with a goal of matching carb intake.

## 2022-05-06 ENCOUNTER — TELEPHONE (OUTPATIENT)
Dept: EDUCATION SERVICES | Facility: CLINIC | Age: 62
End: 2022-05-06
Payer: COMMERCIAL

## 2022-05-06 NOTE — TELEPHONE ENCOUNTER
Called Curtis to change his appointment to see me in [facility] on 5/9, he will come on 5/16 @ 4:00.  His BG are in the 100's now and he feels great.    Ana Lyons RN/DIMAS  [place] Diabetes Educator

## 2022-05-10 ENCOUNTER — TRANSFERRED RECORDS (OUTPATIENT)
Dept: HEALTH INFORMATION MANAGEMENT | Facility: CLINIC | Age: 62
End: 2022-05-10
Payer: COMMERCIAL

## 2022-05-10 LAB — RETINOPATHY: NEGATIVE

## 2022-05-16 ENCOUNTER — ALLIED HEALTH/NURSE VISIT (OUTPATIENT)
Dept: EDUCATION SERVICES | Facility: CLINIC | Age: 62
End: 2022-05-16
Payer: COMMERCIAL

## 2022-05-16 DIAGNOSIS — E11.9 DIABETES MELLITUS WITHOUT COMPLICATION (H): ICD-10-CM

## 2022-05-16 DIAGNOSIS — E11.9 TYPE 2 DIABETES MELLITUS WITHOUT COMPLICATION, WITHOUT LONG-TERM CURRENT USE OF INSULIN (H): Primary | ICD-10-CM

## 2022-05-16 PROCEDURE — G0108 DIAB MANAGE TRN  PER INDIV: HCPCS

## 2022-05-16 NOTE — PATIENT INSTRUCTIONS
Diabetes Support Resources:  Metformin  mg take 2 pills with breakfast and 2 pills with dinner   Trulicity 0.75 mg weekly  Check your BG when you wake up () and 2 hrs after a meal ( <180)  Exercising is great about 30 minutes every other day  Meal planning 3 meals per day with protein and 4-5 carb choices or 60-75 gms of carbs.      Bring blood glucose meter and logbook with you to all doctor and follow-up appointments.    Diabetes Education Telephone Visit Follow-up:    We realize your time is valuable and your health is important! We offer a convenient Telephone Visit follow up! It s a quick way to check in for a medication dose adjustment without having to come back to clinic as soon.    Telephone Visits are often covered by insurance. Please check with your insurance plan to see if this type of visit is covered. If not, the cost is less expensive than an office visit:    Up to 10 minutes (Code 90360): $30  11-20 minutes (Code 67674): $59  More than 20 minutes (Code 17982): $85    Talk with your Diabetes Educator if you want to learn more.      [place] [facility]:  For Your Diabetes Education and Nutrition Appointments Call:  [phone]   For Diabetes Education or Nutrition Related Questions:   Phone: [phone]  Send [employer] Message   If you need a medication refill please contact your pharmacy. Please allow 3 business days for your refills to be completed.

## 2022-05-17 NOTE — PROGRESS NOTES
"Diabetes Self-Management Education & Support    Presents for: Initial Assessment for new diagnosis    SUBJECTIVE/OBJECTIVE:  Presents for: Initial Assessment for new diagnosis  Accompanied by: Self, Spouse  Diabetes education in the past 24mo: No  Focus of Visit: Taking Medication, Diabetes Pathophysiology, Being Active, GLP-1 Start, Healthy Eating  GLP-1 Type: Trulicity  Diabetes type: Type 2  Date of diagnosis: 3/2022  Disease course: Improving  Transportation concerns: No  Difficulty affording diabetes medication?: No  Difficulty affording diabetes testing supplies?: No  Other concerns:: None  Cultural Influences/Ethnic Background:  Not  or       Diabetes Symptoms & Complications:  Fatigue: No  Neuropathy: Sometimes  Polydipsia: No  Polyuria: No  Visual change: Yes  Slow healing wounds: Yes  Symptom course: Improving  Weight trend: Decreasing       Patient Problem List and Family Medical History reviewed for relevant medical history, current medical status, and diabetes risk factors.    Vitals:  There were no vitals taken for this visit.  Estimated body mass index is 25.45 kg/m  as calculated from the following:    Height as of 4/7/22: 1.778 m (5' 10\").    Weight as of 4/7/22: 80.5 kg (177 lb 6.4 oz).   Last 3 BP:   BP Readings from Last 3 Encounters:   04/07/22 (!) 142/88   03/28/22 136/82   03/25/22 132/82       History   Smoking Status     Never Smoker   Smokeless Tobacco     Current User     Types: Chew       Labs:  Lab Results   Component Value Date    A1C 14.6 03/28/2022     Lab Results   Component Value Date     03/28/2022     06/19/2020     Lab Results   Component Value Date    LDL  03/25/2022      Comment:      Cannot estimate LDL when triglyceride exceeds 400 mg/dL     03/25/2022     Direct Measure HDL   Date Value Ref Range Status   03/25/2022 32 (L) >=40 mg/dL Final   ]  GFR Estimate   Date Value Ref Range Status   03/25/2022 >90 >60 mL/min/1.73m2 Final     Comment: "     Effective December 21, 2021 eGFRcr in adults is calculated using the 2021 CKD-EPI creatinine equation which includes age and gender (Val sanders al., NEJ, DOI: 10.1056/HUGRko2593886)   06/23/2020 >60 >60 mL/min/1.73m2 Final   06/19/2020 80 >60 mL/min/[1.73_m2] Final     Comment:     Non  GFR Calc  Starting 12/18/2018, serum creatinine based estimated GFR (eGFR) will be   calculated using the Chronic Kidney Disease Epidemiology Collaboration   (CKD-EPI) equation.       GFR Estimate If Black   Date Value Ref Range Status   06/23/2020 >60 >60 mL/min/1.73m2 Final   06/19/2020 >90 >60 mL/min/[1.73_m2] Final     Comment:      GFR Calc  Starting 12/18/2018, serum creatinine based estimated GFR (eGFR) will be   calculated using the Chronic Kidney Disease Epidemiology Collaboration   (CKD-EPI) equation.       Lab Results   Component Value Date    CR 0.70 03/25/2022    CR 1.01 06/19/2020     No results found for: MICROALBUMIN    Healthy Eating:  Healthy Eating Assessed Today: Yes  Cultural/Samaritan diet restrictions?: No  Breakfast: 1.5 cup wheaties and 1/2 grapefruit 0.5 cup of milk  Lunch: pizza slices 1 large slice and chicken wings  Dinner: steak, sweet potato 1/2 green beans, chocolate chip and toffee cookies  Snacks: ice cream  Beverages: Water  Has patient met with a dietitian in the past?: No    Being Active:  Being Active Assessed Today: No    Monitoring:       Did not bring in his meter, although he has the one touch ultra 2 that is not really covered by his insurance and an old meter, so I did give him the one touch Verio flex meter    Taking Medications:  Diabetes Medication(s)     Biguanides       metFORMIN (GLUCOPHAGE-XR) 500 MG 24 hr tablet    Take 2 tablets (1,000 mg) by mouth 2 times daily (with meals)    Incretin Mimetic Agents (GLP-1 Receptor Agonists)       dulaglutide (TRULICITY) 0.75 MG/0.5ML pen    Inject 0.75 mg Subcutaneous every 7 days          Current Treatments:  Oral Medication (taken by mouth), Non-insulin Injectables  Problems taking diabetes medications regularly?: No  Diabetes medication side effects?: No    Problem Solving:                 Reducing Risks:       Healthy Coping:  Emotional response to diabetes: Ready to learn, Acceptance, Confidence diabetes can be controlled  Informal Support system:: Spouse  Stage of change: PREPARATION (Decided to change - considering how)  Patient Activation Measure Survey Score:  No flowsheet data found.    Diabetes knowledge and skills assessment:   Patient is knowledgeable in diabetes management concepts related to: Monitoring and Taking Medication    Patient needs further education on the following diabetes management concepts: Healthy Eating, Being Active, Monitoring, Taking Medication, Problem Solving and Reducing Risks    Based on learning assessment above, most appropriate setting for further diabetes education would be: Individual setting.      INTERVENTIONS:    Education provided today on:  AADE Self-Care Behaviors:  Diabetes Pathophysiology  Healthy Eating: carbohydrate counting, consistency in amount, composition, and timing of food intake, heart healthy diet, portion control, plate planning method and label reading  Being Active: relationship to blood glucose and describe appropriate activity program  Monitoring: purpose, proper technique, log and interpret results, individual blood glucose targets and frequency of monitoring  Taking Medication: action of prescribed medication, drawing up, administering and storing injectable diabetes medications, proper site selection and rotation for injections, side effects of prescribed medications and when to take medications  Reducing Risks: major complications of diabetes, prevention, early diagnostic measures and treatment of complications, foot care, appropriate dental care, annual eye exam and A1C - goals, relating to blood glucose levels, how often to check  Patient was  instructed on One Touch Verio Flex meter and was able to provide an accurate return demonstration. Patient's blood glucose reading today was  mg/dL.    Opportunities for ongoing education and support in diabetes-self management were discussed.    Pt verbalized understanding of concepts discussed and recommendations provided today.       Education Materials Provided:  [facility] Understanding Diabetes Booklet, Carbohydrate Counting, My Plate Planner and One Touch Verio Flex meter kit      ASSESSMENT:  Curtis is newly DX, his A1C was very high, he states he is now at 120's.  Feels so much better, he was ordered Metformin  mg to take 2000 mg daily, he is only taking 500 mg and 0.75 mg of Trulicity.  He works full time, and the meal planning is very confusing to him.  After our meeting he has a much better concept of how to eat.  He is eating 3 meals per day.  Spent most of the visit today on meal planning , he is coming back in 2 months for reevaluation and more education.        Patient's most recent   Lab Results   Component Value Date    A1C 14.6 03/28/2022    is not meeting goal of <7.0    PLAN  See Patient Instructions for co-developed, patient-stated behavior change goals.  AVS printed and provided to patient today. See Follow-Up section for recommended follow-up.    Ana Lyons RN/DIMAS [facility] Diabetes Educator    Time Spent: 60 minutes  Encounter Type: Individual    Any diabetes medication dose changes were made via the CDE Protocol and Collaborative Practice Agreement with the patient's primary care provider. A copy of this encounter was shared with the provider.

## 2022-06-14 DIAGNOSIS — E11.9 TYPE 2 DIABETES MELLITUS WITHOUT COMPLICATION, WITHOUT LONG-TERM CURRENT USE OF INSULIN (H): ICD-10-CM

## 2022-06-30 ENCOUNTER — TELEPHONE (OUTPATIENT)
Dept: FAMILY MEDICINE | Facility: CLINIC | Age: 62
End: 2022-06-30

## 2022-06-30 NOTE — TELEPHONE ENCOUNTER
Per fax received from [facility]:  Dulaglutide (TRULICITY) 0.75 MG/0.5ML pen   is not covered by patient's Insurance Company  Edith Hay - Please choose:  1.  Change medication that is not covered to a different medication and send new prescription to patient's pharmacy?  2.  Patient will need to pay for the non-covered medication out-of-pocket?   3.  Try for Prior Authorization with Insurance Company to get medication covered?        Key # [id number]

## 2022-07-06 NOTE — TELEPHONE ENCOUNTER
Prior Authorization Approval    Authorization Effective Date: 6/6/2022  Authorization Expiration Date: 7/6/2023  Medication: Dulaglutide (TRULICITY) 0.75 MG/0.5ML pen- APPROVED   Approved Dose/Quantity:   Reference #:     Insurance Company: [employer] - Phone [phone] Fax [phone]  Expected CoPay:       CoPay Card Available:      Foundation Assistance Needed:    Which Pharmacy is filling the prescription (Not needed for infusion/clinic administered): [facility] - [facility] [id number] [facility]  Pharmacy Notified: Yes  Patient Notified:  **Instructed pharmacy to notify patient when script is ready to /ship.**

## 2022-07-06 NOTE — TELEPHONE ENCOUNTER
Central Prior Authorization Team  Phone: [phone]    PA Initiation    Medication: Dulaglutide (TRULICITY) 0.75 MG/0.5ML pen  Insurance Company: [employer] - Phone [phone] Fax [phone]  Pharmacy Filling the Rx: [facility] - [facility] [address] [facility]  Filling Pharmacy Phone: [phone]  Filling Pharmacy Fax:    Start Date: 7/6/2022

## 2022-07-07 ENCOUNTER — OFFICE VISIT (OUTPATIENT)
Dept: FAMILY MEDICINE | Facility: CLINIC | Age: 62
End: 2022-07-07
Payer: COMMERCIAL

## 2022-07-07 VITALS
BODY MASS INDEX: 24.2 KG/M2 | SYSTOLIC BLOOD PRESSURE: 130 MMHG | WEIGHT: 169 LBS | OXYGEN SATURATION: 97 % | TEMPERATURE: 97.4 F | HEART RATE: 76 BPM | HEIGHT: 70 IN | RESPIRATION RATE: 14 BRPM | DIASTOLIC BLOOD PRESSURE: 84 MMHG

## 2022-07-07 DIAGNOSIS — E11.9 TYPE 2 DIABETES MELLITUS WITHOUT COMPLICATION, WITHOUT LONG-TERM CURRENT USE OF INSULIN (H): Primary | ICD-10-CM

## 2022-07-07 LAB
CHOLEST SERPL-MCNC: 177 MG/DL
CREAT UR-MCNC: 180 MG/DL
FASTING STATUS PATIENT QL REPORTED: NO
HBA1C MFR BLD: 6 % (ref 0–5.6)
HDLC SERPL-MCNC: 39 MG/DL
LDLC SERPL CALC-MCNC: 121 MG/DL
MICROALBUMIN UR-MCNC: 10 MG/L
MICROALBUMIN/CREAT UR: 5.56 MG/G CR (ref 0–17)
NONHDLC SERPL-MCNC: 138 MG/DL
TRIGL SERPL-MCNC: 86 MG/DL

## 2022-07-07 PROCEDURE — 82043 UR ALBUMIN QUANTITATIVE: CPT | Performed by: NURSE PRACTITIONER

## 2022-07-07 PROCEDURE — 83036 HEMOGLOBIN GLYCOSYLATED A1C: CPT | Performed by: NURSE PRACTITIONER

## 2022-07-07 PROCEDURE — 36415 COLL VENOUS BLD VENIPUNCTURE: CPT | Performed by: NURSE PRACTITIONER

## 2022-07-07 PROCEDURE — 80061 LIPID PANEL: CPT | Performed by: NURSE PRACTITIONER

## 2022-07-07 PROCEDURE — 99214 OFFICE O/P EST MOD 30 MIN: CPT | Performed by: NURSE PRACTITIONER

## 2022-07-07 ASSESSMENT — PAIN SCALES - GENERAL: PAINLEVEL: NO PAIN (0)

## 2022-07-07 NOTE — PROGRESS NOTES
Assessment & Plan     Type 2 diabetes mellitus without complication, without long-term current use of insulin (H)  He has been doing well with lifestyle change and diabetic management. He is going to stop the trulicity. I suggested increasing the metformin to 500mg two times daily with this discontinuation. Discussed starting a statin and lisinopril. Rechecking his lipids today to see the change. Plan Hemoglobin a1c recheck in 3 months. He will leave a micro albumin at his convenience.   - Albumin Random Urine Quantitative with Creat Ratio; Future  - HEMOGLOBIN A1C; Future  - Albumin Random Urine Quantitative with Creat Ratio  - Lipid panel reflex to direct LDL Fasting; Future  - **A1C FUTURE 3mo; Future  - HEMOGLOBIN A1C  - Lipid panel reflex to direct LDL Fasting                 Return in about 3 months (around 10/7/2022) for  for labs. Diabetes check in 6 months.    [facility]AR[facility]    Namrata Acevedo is a 62 year old, presenting for the following health issues:  RECHECK (diabetes)      History of Present Illness       Diabetes:   He presents for follow up of diabetes.  He is checking home blood glucose two times daily. He checks blood glucose before meals and after meals.  Blood glucose is never over 200 and never under 70. When his blood glucose is low, the patient is asymptomatic for confusion, blurred vision, lethargy and reports not feeling dizzy, shaky, or weak.  He has no concerns regarding his diabetes at this time.  He is not experiencing numbness or burning in feet, excessive thirst, blurry vision, weight changes or redness, sores or blisters on feet.         He eats 2-3 servings of fruits and vegetables daily.He consumes 0 sweetened beverage(s) daily.He exercises with enough effort to increase his heart rate 9 or less minutes per day.  He exercises with enough effort to increase his heart rate 3 or less days per week. He is missing 7 dose(s) of  "medications per week.  He is not taking prescribed medications regularly due to other.  pt has not taken trulicity x 2 weeks     He has been monitoring his blood glucose.  He is using the One Touch Reveal blood glucose. He has noticed trends with food intake on when blood sugars are going to be high.     He decided to retire. He says, \"I haven't felt this good in years\". He is able to exercise and move his body throughout the day.     Vision has improved.       Review of Systems   Constitutional, HEENT, cardiovascular, pulmonary, gi and gu systems are negative, except as otherwise noted.      Objective    /84 (BP Location: Right arm, Patient Position: Chair, Cuff Size: Adult Regular)   Pulse 76   Temp 97.4  F (36.3  C) (Tympanic)   Resp 14   Ht 1.778 m (5' 10\")   Wt 76.7 kg (169 lb)   SpO2 97%   BMI 24.25 kg/m    Body mass index is 24.25 kg/m .     Physical Exam   GENERAL: healthy, alert and no distress  NECK: no adenopathy, no asymmetry, masses, or scars and thyroid normal to palpation  RESP: lungs clear to auscultation - no rales, rhonchi or wheezes  CV: regular rate and rhythm, normal S1 S2, no S3 or S4, no murmur, click or rub, no peripheral edema and peripheral pulses strong  ABDOMEN: soft, nontender, no hepatosplenomegaly, no masses and bowel sounds normal  MS: no gross musculoskeletal defects noted, no edema            The 10-year ASCVD risk score (La[place] Hill DC Jr., et al., 2013) is: 28.9%    Values used to calculate the score:      Age: 62 years      Sex: Male      Is Non- : No      Diabetic: Yes      Tobacco smoker: No      Systolic Blood Pressure: 130 mmHg      Is BP treated: No      HDL Cholesterol: 32 mg/dL      Total Cholesterol: 244 mg/dL      Results for orders placed or performed in visit on 07/07/22 (from the past 24 hour(s))   HEMOGLOBIN A1C   Result Value Ref Range    Hemoglobin A1C 6.0 (H) 0.0 - 5.6 %       .  ..  "

## 2022-07-07 NOTE — PATIENT INSTRUCTIONS
Metformin use for Diabetes. I recommend 500mg twice daily. (1000mg is standard dosing for diabetics but is adjusted based on blood glucose and hemoglobin a1c.)    Statin use for prevention of cardiac     Lisinopril to protect the kidneys.

## 2022-07-11 ENCOUNTER — ALLIED HEALTH/NURSE VISIT (OUTPATIENT)
Dept: EDUCATION SERVICES | Facility: CLINIC | Age: 62
End: 2022-07-11
Payer: COMMERCIAL

## 2022-07-11 DIAGNOSIS — E11.9 DIABETES MELLITUS WITHOUT COMPLICATION (H): Primary | ICD-10-CM

## 2022-07-11 PROCEDURE — G0108 DIAB MANAGE TRN  PER INDIV: HCPCS

## 2022-07-11 NOTE — PROGRESS NOTES
"Diabetes Self-Management Education & Support    Presents for: Follow-up    SUBJECTIVE/OBJECTIVE:  Presents for: Follow-up  Accompanied by: Self  Diabetes education in the past 24mo: Yes  Diabetes type: Type 2  Disease course: Improving  How confident are you filling out medical forms by yourself:: Extremely  Cultural Influences/Ethnic Background:  Not  or       Diabetes Symptoms & Complications:  Fatigue: No  Neuropathy: No  Polydipsia: No  Polyphagia: No  Polyuria: No  Visual change: No  Slow healing wounds: No  Autonomic neuropathy: No  CVA: No  Heart disease: No  Nephropathy: No  Peripheral neuropathy: No  Peripheral Vascular Disease: No  Retinopathy: No  Sexual dysfunction: No    Patient Problem List and Family Medical History reviewed for relevant medical history, current medical status, and diabetes risk factors.    Vitals:  There were no vitals taken for this visit.  Estimated body mass index is 24.25 kg/m  as calculated from the following:    Height as of 7/7/22: 1.778 m (5' 10\").    Weight as of 7/7/22: 76.7 kg (169 lb).   Last 3 BP:   BP Readings from Last 3 Encounters:   07/07/22 130/84   04/07/22 (!) 142/88   03/28/22 136/82       History   Smoking Status     Never Smoker   Smokeless Tobacco     Current User     Types: Chew       Labs:  Lab Results   Component Value Date    A1C 6.0 07/07/2022     Lab Results   Component Value Date     03/28/2022     06/19/2020     Lab Results   Component Value Date     07/07/2022     03/25/2022     Direct Measure HDL   Date Value Ref Range Status   07/07/2022 39 (L) >=40 mg/dL Final   ]  GFR Estimate   Date Value Ref Range Status   03/25/2022 >90 >60 mL/min/1.73m2 Final     Comment:     Effective December 21, 2021 eGFRcr in adults is calculated using the 2021 CKD-EPI creatinine equation which includes age and gender (Val sanders al., [place], DOI: 10.1056/[id number])   06/23/2020 >60 >60 mL/min/1.73m2 Final   06/19/2020 80 >60 " mL/min/[1.73_m2] Final     Comment:     Non  GFR Calc  Starting 12/18/2018, serum creatinine based estimated GFR (eGFR) will be   calculated using the Chronic Kidney Disease Epidemiology Collaboration   (CKD-EPI) equation.       GFR Estimate If Black   Date Value Ref Range Status   06/23/2020 >60 >60 mL/min/1.73m2 Final   06/19/2020 >90 >60 mL/min/[1.73_m2] Final     Comment:      GFR Calc  Starting 12/18/2018, serum creatinine based estimated GFR (eGFR) will be   calculated using the Chronic Kidney Disease Epidemiology Collaboration   (CKD-EPI) equation.       Lab Results   Component Value Date    CR 0.70 03/25/2022    CR 1.01 06/19/2020     No results found for: MICROALBUMIN    Healthy Eating:  Cultural/Mormon diet restrictions?: No  Meal planning/habits: Avoiding sweets, Low carb, Smaller portions  How many times a week on average do you eat food made away from home (restaurant/take-out)?: 1  Meals include: Breakfast, Lunch, Dinner, Evening Snack  Breakfast: eggs or wheaties or V-8,  Lunch: [place] or left overs,  Dinner: chicken and pork, and beef sometimes, veggies and  Snacks: apple or an orange  Other: sometimes a cookie and some ice cream treats  Beverages: Water, Other  Has patient met with a dietitian in the past?: No    Being Active:  Being Active Assessed Today: Yes  Exercise:: Yes  Days per week of moderate to strenuous exercise (like a brisk walk): 6  On average, minutes per day of exercise at this level: 90  How intense was your typical exercise? : Moderate (like brisk walking)  Exercise Minutes per Week: 540  Barrier to exercise: None    Monitoring:  Monitoring Assessed Today: Yes  Did patient bring glucose meter to appointment? : Yes  Blood Glucose Meter: One Touch  Times checking blood sugar at home (number): 2  Times checking blood sugar at home (per): Day  Blood glucose trend: Decreasing                        Taking Medications:  Diabetes Medication(s)      Biguanides       metFORMIN (GLUCOPHAGE-XR) 500 MG 24 hr tablet    Take 2 tablets (1,000 mg) by mouth 2 times daily (with meals)    Incretin Mimetic Agents (GLP-1 Receptor Agonists)       dulaglutide (TRULICITY) 0.75 MG/0.5ML pen    Inject 0.75 mg Subcutaneous every 7 days     Patient not taking: Reported on 7/7/2022          Taking Medication Assessed Today: Yes  Current Treatments: Diet, Oral Medication (taken by mouth)  Problems taking diabet      es medications regularly?: No  Diabetes medication side effects?: No    Problem Solving:  Problem Solving Assessed Today: Yes  Is the patient at risk for hypoglycemia?: No              Reducing Risks:  Reducing Risks Assessed Today: Yes  Diabetes Risks: Age over 45 years, Family History, Hypertriglyceridemia  CAD Risks: Diabetes Mellitus, Tobacco exposure, Family history, Male sex, Hypertension  Has dilated eye exam at least once a year?: Yes  Sees dentist every 6 months?: No  Feet checked by healthcare provider in the last year?: No    Healthy Coping:  Healthy Coping Assessed Today: Yes  Emotional response to diabetes: Acceptance, Confidence diabetes can be controlled  Informal Support system:: Family, Spouse  Stage of change: ACTION (Actively working towards change)  Patient Activation Measure Survey Score:  No flowsheet data found.    Diabetes knowledge and skills assessment:   Patient is knowledgeable in diabetes management concepts related to: Healthy Eating, Being Active, Monitoring, Taking Medication, Problem Solving and Reducing Risks    Patient needs further education on the following diabetes management concepts: Healthy Eating, Being Active, Monitoring, Taking Medication and Problem Solving    Based on learning assessment above, most appropriate setting for further diabetes education would be: Individual setting.      INTERVENTIONS:    Education provided today on:  AADE Self-Care Behaviors:  Diabetes Pathophysiology  Healthy Eating: carbohydrate counting,  consistency in amount, composition, and timing of food intake, heart healthy diet, portion control, plate planning method and label reading  Monitoring: purpose, proper technique, log and interpret results, individual blood glucose targets and frequency of monitoring  Taking Medication: action of prescribed medication, drawing up, administering and storing injectable diabetes medications, proper site selection and rotation for injections, side effects of prescribed medications and when to take medications    Opportunities for ongoing education and support in diabetes-self management were discussed.    Pt verbalized understanding of concepts discussed and recommendations provided today.       Education Materials Provided:  Living Healthy with Diabetes      ASSESSMENT:  See previous note        Patient's most recent   Lab Results   Component Value Date    A1C 6.0 07/07/2022    is meeting goal of <7.0    PLAN  See Patient Instructions for co-developed, patient-stated behavior change goals.  AVS printed and provided to patient today. See Follow-Up section for recommended follow-up.    Ana Lyons RN/DIMAS [facility] Diabetes Educator    Time Spent: 60 minutes  Encounter Type: Individual    Any diabetes medication dose changes were made via the CDE Protocol and Collaborative Practice Agreement with the patient's primary care provider. A copy of this encounter was shared with the provider.

## 2022-07-11 NOTE — PATIENT INSTRUCTIONS
Diabetes Support Resources:  Try to get the Metformin  mg to get to goal of 2 pills with breakfast and 2 pills with dinner  Continue the meal planning be sure to have 3 meals daily to get in at least 4 carb choices daily with your snacks, of protein and a carb, especially at bedtime.    Hydration is important.       Bring blood glucose meter and logbook with you to all doctor and follow-up appointments.           Diabetes Education Telephone Visit Follow-up:    We realize your time is valuable and your health is important! We offer a convenient Telephone Visit follow up! It s a quick way to check in for a medication dose adjustment without having to come back to clinic as soon.    Telephone Visits are often covered by insurance. Please check with your insurance plan to see if this type of visit is covered. If not, the cost is less expensive than an office visit:    Up to 10 minutes (Code 65273): $30  11-20 minutes (Code 35351): $59  More than 20 minutes (Code 59212): $85    Talk with your Diabetes Educator if you want to learn more.      [facility]:  For Your Diabetes Education and Nutrition Appointments Call:  [phone]   For Diabetes Education or Nutrition Related Questions:   Phone: [phone]  Send [employer] Message   If you need a medication refill please contact your pharmacy. Please allow 3 business days for your refills to be completed.

## 2022-10-03 ENCOUNTER — DOCUMENTATION ONLY (OUTPATIENT)
Dept: LAB | Facility: CLINIC | Age: 62
End: 2022-10-03

## 2022-10-03 DIAGNOSIS — E11.9 DIABETES MELLITUS, TYPE 2 (H): Primary | ICD-10-CM

## 2022-10-07 ENCOUNTER — LAB (OUTPATIENT)
Dept: LAB | Facility: CLINIC | Age: 62
End: 2022-10-07
Payer: COMMERCIAL

## 2022-10-07 DIAGNOSIS — E11.9 DIABETES MELLITUS, TYPE 2 (H): ICD-10-CM

## 2022-10-07 LAB — HBA1C MFR BLD: 5.7 % (ref 0–5.6)

## 2022-10-07 PROCEDURE — 36415 COLL VENOUS BLD VENIPUNCTURE: CPT

## 2022-10-07 PROCEDURE — 83036 HEMOGLOBIN GLYCOSYLATED A1C: CPT

## 2022-11-01 DIAGNOSIS — E11.9 TYPE 2 DIABETES MELLITUS WITHOUT COMPLICATION, WITHOUT LONG-TERM CURRENT USE OF INSULIN (H): ICD-10-CM

## 2022-11-19 ENCOUNTER — HEALTH MAINTENANCE LETTER (OUTPATIENT)
Age: 62
End: 2022-11-19

## 2023-01-10 ENCOUNTER — OFFICE VISIT (OUTPATIENT)
Dept: FAMILY MEDICINE | Facility: CLINIC | Age: 63
End: 2023-01-10
Payer: COMMERCIAL

## 2023-01-10 VITALS
HEART RATE: 83 BPM | BODY MASS INDEX: 24.65 KG/M2 | SYSTOLIC BLOOD PRESSURE: 128 MMHG | TEMPERATURE: 97.3 F | HEIGHT: 70 IN | OXYGEN SATURATION: 97 % | WEIGHT: 172.2 LBS | DIASTOLIC BLOOD PRESSURE: 74 MMHG

## 2023-01-10 DIAGNOSIS — L20.82 FLEXURAL ECZEMA: ICD-10-CM

## 2023-01-10 DIAGNOSIS — E11.9 TYPE 2 DIABETES MELLITUS WITHOUT COMPLICATION, WITHOUT LONG-TERM CURRENT USE OF INSULIN (H): Primary | ICD-10-CM

## 2023-01-10 LAB — HBA1C MFR BLD: 5.7 % (ref 0–5.6)

## 2023-01-10 PROCEDURE — 83036 HEMOGLOBIN GLYCOSYLATED A1C: CPT | Performed by: NURSE PRACTITIONER

## 2023-01-10 PROCEDURE — 36415 COLL VENOUS BLD VENIPUNCTURE: CPT | Performed by: NURSE PRACTITIONER

## 2023-01-10 PROCEDURE — 99214 OFFICE O/P EST MOD 30 MIN: CPT | Performed by: NURSE PRACTITIONER

## 2023-01-10 RX ORDER — BETAMETHASONE DIPROPIONATE 0.5 MG/G
CREAM TOPICAL 2 TIMES DAILY
Qty: 45 G | Refills: 1 | Status: SHIPPED | OUTPATIENT
Start: 2023-01-10 | End: 2023-01-10

## 2023-01-10 RX ORDER — BETAMETHASONE DIPROPIONATE 0.5 MG/G
CREAM TOPICAL 2 TIMES DAILY
Qty: 45 G | Refills: 1 | Status: SHIPPED | OUTPATIENT
Start: 2023-01-10

## 2023-01-10 ASSESSMENT — PAIN SCALES - GENERAL: PAINLEVEL: NO PAIN (0)

## 2023-01-10 NOTE — PROGRESS NOTES
Assessment & Plan     Type 2 diabetes mellitus without complication, without long-term current use of insulin (H)  Doing very well overall with management of diabetes.  Continues to tolerate the liraglutide and the metformin well.  A1c checked today 5.7%.  Plan Next diabetes check in 6 months.  - HEMOGLOBIN A1C; Future  - dulaglutide (TRULICITY) 0.75 MG/0.5ML pen; Inject 0.75 mg Subcutaneous every 7 days  - HEMOGLOBIN A1C    Flexural eczema  Triamcinolone has not been beneficial for eczema symptoms.  Recommend trialing betamethasone.  - betamethasone dipropionate (DIPROSONE) 0.05 % external cream; Apply topically 2 times daily                 Return in about 6 months (around 7/10/2023) for Diabetes Follow up, or sooner if symptoms fail to improve.    HARSH Rowe [facility]    Namrata Acevedo is a 62 year old, presenting for the following health issues:  Diabetes      History of Present Illness       Diabetes:   He presents for follow up of diabetes.  He is checking home blood glucose two times daily. He checks blood glucose before meals and after meals.  Blood glucose is never over 200 and never under 70. When his blood glucose is low, the patient is asymptomatic for confusion, blurred vision, lethargy and reports not feeling dizzy, shaky, or weak.  He has no concerns regarding his diabetes at this time.  He is not experiencing numbness or burning in feet, excessive thirst, blurry vision, weight changes or redness, sores or blisters on feet.         He eats 2-3 servings of fruits and vegetables daily.He consumes 0 sweetened beverage(s) daily.He exercises with enough effort to increase his heart rate 9 or less minutes per day.  He exercises with enough effort to increase his heart rate 3 or less days per week.   He is taking medications regularly.     Average AM , after meal 140 at dinner.   Overall doing well. He has been adjusting his diet over the last couple  "months. He has been monitoring his sugars.     He is taking metformin 1x daily.     He also mentions ongoing symptoms of eczema on his hands and shin.  Itching and skin changes noted.  Triamcinolone attempted without significant improvement.  He reports he has utilized betamethasone in the past and felt that this was helpful.    Review of Systems   Constitutional, HEENT, cardiovascular, pulmonary, gi and gu systems are negative, except as otherwise noted.      Objective    /74 (BP Location: Right arm, Patient Position: Sitting, Cuff Size: Adult Large)   Pulse 83   Temp 97.3  F (36.3  C) (Tympanic)   Ht 1.775 m (5' 9.88\")   Wt 78.1 kg (172 lb 3.2 oz)   SpO2 97%   BMI 24.79 kg/m    Body mass index is 24.79 kg/m .     Physical Exam   GENERAL: healthy, alert and no distress  NECK: no adenopathy, no asymmetry, masses, or scars and thyroid normal to palpation  RESP: lungs clear to auscultation - no rales, rhonchi or wheezes  CV: regular rate and rhythm, normal S1 S2, no S3 or S4, no murmur, click or rub, no peripheral edema and peripheral pulses strong  ABDOMEN: soft, nontender, no hepatosplenomegaly, no masses and bowel sounds normal  MS: no gross musculoskeletal defects noted, no edema  SKIN: Eczematous lesion on right lateral hand as well as right shin and ankle.  Dry and cracking present.  No specific plaquing noted.                    "

## 2023-06-01 ENCOUNTER — HEALTH MAINTENANCE LETTER (OUTPATIENT)
Age: 63
End: 2023-06-01

## 2023-06-03 DIAGNOSIS — E11.9 TYPE 2 DIABETES MELLITUS WITHOUT COMPLICATION, WITHOUT LONG-TERM CURRENT USE OF INSULIN (H): ICD-10-CM

## 2023-06-05 RX ORDER — BLOOD SUGAR DIAGNOSTIC
STRIP MISCELLANEOUS
Qty: 200 STRIP | Refills: 1 | Status: SHIPPED | OUTPATIENT
Start: 2023-06-05 | End: 2024-04-08

## 2023-06-21 ASSESSMENT — ENCOUNTER SYMPTOMS
JOINT SWELLING: 0
HEADACHES: 0
SORE THROAT: 0
SHORTNESS OF BREATH: 0
DIARRHEA: 0
WEAKNESS: 0
NERVOUS/ANXIOUS: 0
PALPITATIONS: 0
NAUSEA: 0
DYSURIA: 0
FREQUENCY: 0
CONSTIPATION: 0
COUGH: 0
HEMATURIA: 0
CHILLS: 0
MYALGIAS: 0
FEVER: 0
HEMATOCHEZIA: 0
HEARTBURN: 0
DIZZINESS: 0
ABDOMINAL PAIN: 0
ARTHRALGIAS: 0
PARESTHESIAS: 1
EYE PAIN: 0

## 2023-06-22 ENCOUNTER — LAB (OUTPATIENT)
Dept: LAB | Facility: CLINIC | Age: 63
End: 2023-06-22
Payer: COMMERCIAL

## 2023-06-22 ENCOUNTER — OFFICE VISIT (OUTPATIENT)
Dept: FAMILY MEDICINE | Facility: CLINIC | Age: 63
End: 2023-06-22
Payer: COMMERCIAL

## 2023-06-22 VITALS
DIASTOLIC BLOOD PRESSURE: 80 MMHG | BODY MASS INDEX: 24.62 KG/M2 | TEMPERATURE: 97.7 F | HEIGHT: 70 IN | OXYGEN SATURATION: 98 % | HEART RATE: 68 BPM | RESPIRATION RATE: 16 BRPM | WEIGHT: 172 LBS | SYSTOLIC BLOOD PRESSURE: 140 MMHG

## 2023-06-22 DIAGNOSIS — I10 BENIGN ESSENTIAL HYPERTENSION: ICD-10-CM

## 2023-06-22 DIAGNOSIS — E11.9 TYPE 2 DIABETES MELLITUS WITHOUT COMPLICATION, WITHOUT LONG-TERM CURRENT USE OF INSULIN (H): ICD-10-CM

## 2023-06-22 DIAGNOSIS — E11.9 DIABETES MELLITUS, TYPE 2 (H): ICD-10-CM

## 2023-06-22 DIAGNOSIS — E78.5 HYPERLIPIDEMIA LDL GOAL <70: ICD-10-CM

## 2023-06-22 DIAGNOSIS — Z00.00 ROUTINE GENERAL MEDICAL EXAMINATION AT A HEALTH CARE FACILITY: Primary | ICD-10-CM

## 2023-06-22 LAB
ANION GAP SERPL CALCULATED.3IONS-SCNC: 9 MMOL/L (ref 7–15)
BUN SERPL-MCNC: 11.9 MG/DL (ref 8–23)
CALCIUM SERPL-MCNC: 9.1 MG/DL (ref 8.8–10.2)
CHLORIDE SERPL-SCNC: 108 MMOL/L (ref 98–107)
CHOLEST SERPL-MCNC: 169 MG/DL
CREAT SERPL-MCNC: 0.86 MG/DL (ref 0.67–1.17)
DEPRECATED HCO3 PLAS-SCNC: 22 MMOL/L (ref 22–29)
GFR SERPL CREATININE-BSD FRML MDRD: >90 ML/MIN/1.73M2
GLUCOSE SERPL-MCNC: 139 MG/DL (ref 70–99)
HBA1C MFR BLD: 5.8 % (ref 0–5.6)
HDLC SERPL-MCNC: 41 MG/DL
LDLC SERPL CALC-MCNC: 110 MG/DL
NONHDLC SERPL-MCNC: 128 MG/DL
POTASSIUM SERPL-SCNC: 4 MMOL/L (ref 3.4–5.3)
SODIUM SERPL-SCNC: 139 MMOL/L (ref 136–145)
TRIGL SERPL-MCNC: 91 MG/DL

## 2023-06-22 PROCEDURE — 36415 COLL VENOUS BLD VENIPUNCTURE: CPT

## 2023-06-22 PROCEDURE — 80048 BASIC METABOLIC PNL TOTAL CA: CPT

## 2023-06-22 PROCEDURE — 80061 LIPID PANEL: CPT

## 2023-06-22 PROCEDURE — 99214 OFFICE O/P EST MOD 30 MIN: CPT | Mod: 25 | Performed by: NURSE PRACTITIONER

## 2023-06-22 PROCEDURE — 83036 HEMOGLOBIN GLYCOSYLATED A1C: CPT

## 2023-06-22 PROCEDURE — 99396 PREV VISIT EST AGE 40-64: CPT | Performed by: NURSE PRACTITIONER

## 2023-06-22 RX ORDER — ASPIRIN 81 MG/1
81 TABLET ORAL DAILY
Start: 2023-06-22

## 2023-06-22 RX ORDER — LISINOPRIL 20 MG/1
20 TABLET ORAL DAILY
Qty: 90 TABLET | Refills: 3 | Status: SHIPPED | OUTPATIENT
Start: 2023-06-22 | End: 2024-06-27

## 2023-06-22 RX ORDER — ATORVASTATIN CALCIUM 40 MG/1
40 TABLET, FILM COATED ORAL DAILY
Qty: 90 TABLET | Refills: 3 | Status: SHIPPED | OUTPATIENT
Start: 2023-06-22 | End: 2024-06-27

## 2023-06-22 ASSESSMENT — ENCOUNTER SYMPTOMS
NERVOUS/ANXIOUS: 0
NAUSEA: 0
COUGH: 0
CONSTIPATION: 0
WEAKNESS: 0
PALPITATIONS: 0
FREQUENCY: 0
HEMATOCHEZIA: 0
MYALGIAS: 0
PARESTHESIAS: 1
HEADACHES: 0
DYSURIA: 0
ABDOMINAL PAIN: 0
CHILLS: 0
EYE PAIN: 0
ARTHRALGIAS: 0
SHORTNESS OF BREATH: 0
HEMATURIA: 0
DIARRHEA: 0
HEARTBURN: 0
DIZZINESS: 0
FEVER: 0
JOINT SWELLING: 0
SORE THROAT: 0

## 2023-06-22 NOTE — PROGRESS NOTES
SUBJECTIVE:   CC: Curtis is an 62 year old who presents for preventative health visit.       6/22/2023     8:59 AM   Additional Questions   Roomed by Kayla HIGHTOWER     Healthy Habits:     Getting at least 3 servings of Calcium per day:  Yes    Bi-annual eye exam:  Yes    Dental care twice a year:  NO    Sleep apnea or symptoms of sleep apnea:  None    Diet:  Regular (no restrictions) and Diabetic    Frequency of exercise:  None    Taking medications regularly:  Yes    Medication side effects:  None    PHQ-2 Total Score: 0    Additional concerns today:  No      Diabetes Follow-up    How often are you checking your blood sugar? Two times daily  Blood sugar testing frequency justification:  Patient modifying lifestyle changes (diet, exercise) with blood sugars  What time of day are you checking your blood sugars (select all that apply)?  Before and after meals  Have you had any blood sugars above 200?  No  Have you had any blood sugars below 70?  No    What symptoms do you notice when your blood sugar is low?  Lethargy    What concerns do you have today about your diabetes? None     Do you have any of these symptoms? (Select all that apply)  No numbness or tingling in feet.  No redness, sores or blisters on feet.  No complaints of excessive thirst.  No reports of blurry vision.  No significant changes to weight.    Have you had a diabetic eye exam in the last 12 months? No            Hyperlipidemia Follow-Up      Are you regularly taking any medication or supplement to lower your cholesterol?   No    Are you having muscle aches or other side effects that you think could be caused by your cholesterol lowering medication?  N/a: will start.     Hypertension Follow-up      Do you check your blood pressure regularly outside of the clinic? No     Are you following a low salt diet? Yes    Are your blood pressures ever more than 140 on the top number (systolic) OR more   than 90 on the bottom number (diastolic), for example 140/90?  Yes    BP Readings from Last 2 Encounters:   06/22/23 (!) 140/80   01/10/23 128/74     Hemoglobin A1C (%)   Date Value   06/22/2023 5.8 (H)   01/10/2023 5.7 (H)     LDL Cholesterol Calculated   Date Value   07/07/2022 121 mg/dL (H)   03/25/2022      Comment:     Cannot estimate LDL when triglyceride exceeds 400 mg/dL     LDL Cholesterol Direct (mg/dL)   Date Value   03/25/2022 163 (H)         Today's PHQ-2 Score:       6/21/2023     2:37 PM   PHQ-2 ( 1999 [employer])   Q1: Little interest or pleasure in doing things 0   Q2: Feeling down, depressed or hopeless 0   PHQ-2 Score 0   Q1: Little interest or pleasure in doing things Not at all   Q2: Feeling down, depressed or hopeless Not at all   PHQ-2 Score 0           Social History     Tobacco Use     Smoking status: Every Day     Types: Dip, chew, snus or snuff     Smokeless tobacco: Current     Types: Chew   Substance Use Topics     Alcohol use: Not Currently             6/21/2023     2:36 PM   Alcohol Use   Prescreen: >3 drinks/day or >7 drinks/week? Not Applicable       Last PSA:   Prostate Specific Antigen Screen   Date Value Ref Range Status   07/02/2018 0.6 0.0 - 3.5 ng/mL Final       Reviewed orders with patient. Reviewed health maintenance and updated orders accordingly - Yes  Lab work is in process  Labs reviewed in EPIC  BP Readings from Last 3 Encounters:   06/22/23 (!) 140/80   01/10/23 128/74   07/07/22 130/84    Wt Readings from Last 3 Encounters:   06/22/23 78 kg (172 lb)   01/10/23 78.1 kg (172 lb 3.2 oz)   07/07/22 76.7 kg (169 lb)                  Patient Active Problem List   Diagnosis     Dizziness     Dyspnea on exertion     Abnormal resting ECG findings     Diabetes mellitus, type 2 (H)     Past Surgical History:   Procedure Laterality Date     COLONOSCOPY N/A 9/23/2021    Procedure: COLONOSCOPY;  Surgeon: Doimnic Bell MD;  Location: [facility]       Social History     Tobacco Use     Smoking status: Every Day     Types: Dip, chew, snus or snuff      Smokeless tobacco: Current     Types: Chew   Substance Use Topics     Alcohol use: Not Currently     Family History   Problem Relation Age of Onset     Hypertension Mother      Breast Cancer Mother      Diabetes Father      Hypertension Father      Hyperlipidemia Father      Other Cancer Father      Diabetes Brother          Current Outpatient Medications   Medication Sig Dispense Refill     alcohol swab prep pads Use to swab area of injection/brian as directed. 100 each 3     atorvastatin (LIPITOR) 40 MG tablet Take 1 tablet (40 mg) by mouth daily 90 tablet 3     betamethasone dipropionate (DIPROSONE) 0.05 % external cream Apply topically 2 times daily 45 g 1     blood glucose (ONETOUCH VERIO IQ) test strip Use to test blood sugar 2 times daily or as directed. Use for the one touch Verio meter 200 strip 1     dulaglutide (TRULICITY) 0.75 MG/0.5ML pen Inject 0.75 mg Subcutaneous every 7 days 6 mL 3     lisinopril (ZESTRIL) 20 MG tablet Take 1 tablet (20 mg) by mouth daily 90 tablet 3     metFORMIN (GLUCOPHAGE-XR) 500 MG 24 hr tablet Take 2 tablets (1,000 mg) by mouth 2 times daily (with meals) 360 tablet 3     nicotine (NICORETTE) 2 MG gum Take 2 mg by mouth       thin (NO BRAND SPECIFIED) lancets Use with lanceting device. To accompany: Blood Glucose Monitor Brands: per insurance. 100 each 6     triamcinolone (KENALOG) 0.1 % external cream Apply topically 2 times daily (Patient not taking: Reported on 6/22/2023) 45 g 1     Allergies   Allergen Reactions     Amoxicillin Hives     Recent Labs   Lab Test 06/22/23  0905 01/10/23  1137 10/07/22  1046 07/07/22  1015 03/28/22  0727 03/25/22  1444 06/23/20  1240 06/20/20  0057 06/20/20  0057 06/19/20  1725 06/07/20  1921 06/07/20  1921 07/02/18  1849   A1C 5.8* 5.7* 5.7* 6.0*   < >  --   --   --   --   --   --   --   --    LDL  --   --   --  121*  --  163*  --   --   --   --   --   --  141*   HDL  --   --   --  39*  --  32*  --   --   --   --   --   --  44   TRIG  --   --    --  86  --  460*  --   --   --   --   --   --  63   ALT  --   --   --   --   --  31  --   --   --  30  --  22  --    CR  --   --   --   --   --  0.70 0.84   < > 0.95 1.01   < > 0.90  --    GFRESTIMATED  --   --   --   --   --  >90 >60   < > >60 80   < > >90  --    GFRESTBLACK  --   --   --   --   --   --  >60  --  >60 >90   < > >90  --    POTASSIUM  --   --   --   --   --  3.9 4.0   < > 3.8 4.0   < > 3.6  --    TSH  --   --   --   --   --  1.21  --   --   --   --   --   --   --     < > = values in this interval not displayed.        Reviewed and updated as needed this visit by clinical staff   Tobacco  Allergies  Meds  Problems  Med Hx  Surg Hx  Fam Hx          Reviewed and updated as needed this visit by Provider   Tobacco  Allergies  Meds  Problems  Med Hx  Surg Hx  Fam Hx         Past Medical History:   Diagnosis Date     Diabetes (H)       Past Surgical History:   Procedure Laterality Date     COLONOSCOPY N/A 9/23/2021    Procedure: COLONOSCOPY;  Surgeon: Dominic Bell MD;  Location: [facility]       Review of Systems   Constitutional: Negative for chills and fever.   HENT: Negative for congestion, ear pain, hearing loss and sore throat.    Eyes: Negative for pain and visual disturbance.   Respiratory: Negative for cough and shortness of breath.    Cardiovascular: Negative for chest pain, palpitations and peripheral edema.   Gastrointestinal: Negative for abdominal pain, constipation, diarrhea, heartburn, hematochezia and nausea.   Genitourinary: Negative for dysuria, frequency, genital sores, hematuria, impotence, penile discharge and urgency.   Musculoskeletal: Negative for arthralgias, joint swelling and myalgias.   Skin: Positive for rash.   Neurological: Positive for paresthesias. Negative for dizziness, weakness and headaches.   Psychiatric/Behavioral: Negative for mood changes. The patient is not nervous/anxious.          OBJECTIVE:   BP (!) 140/80   Pulse 68   Temp 97.7  F (36.5  C)  "(Tympanic)   Resp 16   Ht 1.778 m (5' 10\")   Wt 78 kg (172 lb)   SpO2 98%   BMI 24.68 kg/m      Physical Exam  GENERAL: healthy, alert and no distress  EYES: Eyes grossly normal to inspection, PERRL and conjunctivae and sclerae normal  HENT: ear canals and TM's normal, nose and mouth without ulcers or lesions  NECK: no adenopathy, no asymmetry, masses, or scars and thyroid normal to palpation  RESP: lungs clear to auscultation - no rales, rhonchi or wheezes  CV: regular rate and rhythm, normal S1 S2, no S3 or S4, no murmur, click or rub, no peripheral edema and peripheral pulses strong  ABDOMEN: soft, nontender, no hepatosplenomegaly, no masses and bowel sounds normal  MS: no gross musculoskeletal defects noted, no edema  SKIN: no suspicious lesions or rashes  NEURO: Normal strength and tone, mentation intact and speech normal  PSYCH: mentation appears normal, affect normal/bright  Diabetic foot exam: normal DP and PT pulses, no trophic changes or ulcerative lesions, normal sensory exam and normal monofilament exam, except for findings noted on diabetic foot graphical image.                  ASSESSMENT/PLAN:       ICD-10-CM    1. Routine general medical examination at a health care facility  Z00.00       2. Type 2 diabetes mellitus without complication, without long-term current use of insulin (H)  E11.9 BASIC METABOLIC PANEL     HEMOGLOBIN A1C     Lipid panel reflex to direct LDL Non-fasting     Albumin Random Urine Quantitative with Creat Ratio     aspirin 81 MG EC tablet      3. Hyperlipidemia LDL goal <70  E78.5 atorvastatin (LIPITOR) 40 MG tablet      4. Benign essential hypertension  I10 lisinopril (ZESTRIL) 20 MG tablet     **Basic metabolic panel FUTURE 14d        Healthy Male exam completed today.  I discussed preventative measures with the patient including continuing with lifestyle modifications of a balanced diet and regular cardiovascular exercise.  Hemoglobin a1c well controlled. Will start statin " and ace today for cholesterol and BP management. Encouraged asa. He agrees. Plan follow up in 2 weeks for RN and Lab recheck.  I encouraged patient to follow-up in 6 months for Diabetes check and yearly for annual physicals.      Patient has been advised of split billing requirements and indicates understanding: Yes      COUNSELING:   Reviewed preventive health counseling, as reflected in patient instructions       Regular exercise       Healthy diet/nutrition        He reports that he has been smoking dip, chew, snus or snuff. His smokeless tobacco use includes chew.  Nicotine/Tobacco Cessation Plan:   Information offered: Patient not interested at this time        HARSH Rowe CNP  [facility]

## 2023-06-27 ENCOUNTER — TRANSFERRED RECORDS (OUTPATIENT)
Dept: HEALTH INFORMATION MANAGEMENT | Facility: CLINIC | Age: 63
End: 2023-06-27
Payer: COMMERCIAL

## 2023-07-21 ENCOUNTER — TELEPHONE (OUTPATIENT)
Dept: FAMILY MEDICINE | Facility: CLINIC | Age: 63
End: 2023-07-21
Payer: COMMERCIAL

## 2023-07-21 NOTE — TELEPHONE ENCOUNTER
Central Prior Authorization Team   Phone: [phone]      PA Initiation    Medication: DULAGLUTIDE 0.75 MG/0.5ML SC SOPN  Insurance Company: [employer] - Phone [phone] Fax [phone]  Pharmacy Filling the Rx: [facility] - [place], [place] - [address]  Filling Pharmacy Phone:    Filling Pharmacy Fax:    Start Date: 7/21/2023

## 2023-07-21 NOTE — TELEPHONE ENCOUNTER
Central Prior Authorization Team   Phone: [phone]      Prior Authorization Approval    Medication: DULAGLUTIDE 0.75 MG/0.5ML SC SOPN  Authorization Effective Date: 6/21/2023  Authorization Expiration Date: 7/20/2024  Approved Dose/Quantity:   Reference #:     Insurance Company: [employer] - Phone [phone] Fax [phone]  Expected CoPay:       CoPay Card Available: No    Financial Assistance Needed: n/a  Which Pharmacy is filling the prescription: [facility] - [address]  Pharmacy Notified: No  Patient Notified: Yes      I called and left a generic voicemail to notify the patient of the approval - he may contact [facility] to arrange delivery of the medication. He may also call his provider's office, [facility], or the [employer] PA team with further questions.

## 2023-11-12 ENCOUNTER — APPOINTMENT (OUTPATIENT)
Dept: GENERAL RADIOLOGY | Facility: CLINIC | Age: 63
End: 2023-11-12
Attending: PHYSICIAN ASSISTANT
Payer: COMMERCIAL

## 2023-11-12 ENCOUNTER — HOSPITAL ENCOUNTER (EMERGENCY)
Facility: CLINIC | Age: 63
Discharge: HOME | End: 2023-11-12
Attending: PHYSICIAN ASSISTANT | Admitting: PHYSICIAN ASSISTANT
Payer: COMMERCIAL

## 2023-11-12 VITALS
DIASTOLIC BLOOD PRESSURE: 88 MMHG | HEIGHT: 70 IN | OXYGEN SATURATION: 98 % | SYSTOLIC BLOOD PRESSURE: 153 MMHG | BODY MASS INDEX: 24.34 KG/M2 | TEMPERATURE: 98.5 F | RESPIRATION RATE: 16 BRPM | WEIGHT: 170 LBS | HEART RATE: 71 BPM

## 2023-11-12 DIAGNOSIS — S92.415A CLOSED NONDISPLACED FRACTURE OF PROXIMAL PHALANX OF LEFT GREAT TOE, INITIAL ENCOUNTER: ICD-10-CM

## 2023-11-12 PROCEDURE — 99213 OFFICE O/P EST LOW 20 MIN: CPT | Mod: 57 | Performed by: PHYSICIAN ASSISTANT

## 2023-11-12 PROCEDURE — 28490 TREAT BIG TOE FRACTURE: CPT | Mod: TA | Performed by: PHYSICIAN ASSISTANT

## 2023-11-12 PROCEDURE — 73630 X-RAY EXAM OF FOOT: CPT | Mod: LT

## 2023-11-12 PROCEDURE — G0463 HOSPITAL OUTPT CLINIC VISIT: HCPCS | Mod: 25 | Performed by: PHYSICIAN ASSISTANT

## 2023-11-12 ASSESSMENT — ACTIVITIES OF DAILY LIVING (ADL): ADLS_ACUITY_SCORE: 35

## 2023-11-12 ASSESSMENT — ENCOUNTER SYMPTOMS: CONSTITUTIONAL NEGATIVE: 1

## 2023-11-12 NOTE — ED PROVIDER NOTES
History     Chief Complaint   Patient presents with    Foot Pain     HPI  Navdeep Dunn is a 63 year old male who presents to Urgent Care with complaints of left foot injury yesterday.  Patient states he got tangled up in his dog's leash and tripped and fell, injuring his left great toe and left foot.  He has had pain, swelling, and bruising throughout the left great toe and into the dorsal lateral foot.  Patient states he has increased pain with weightbearing.  Denies any associated ankle pain or swelling.  Denies fevers or chills.      Allergies:  Allergies   Allergen Reactions    Amoxicillin Hives       Problem List:    Patient Active Problem List    Diagnosis Date Noted    Diabetes mellitus, type 2 (H) 03/28/2022     Priority: Medium    Dyspnea on exertion      Priority: Medium    Abnormal resting ECG findings      Priority: Medium    Dizziness 06/19/2020     Priority: Medium        Past Medical History:    Past Medical History:   Diagnosis Date    Diabetes (H)        Past Surgical History:    Past Surgical History:   Procedure Laterality Date    COLONOSCOPY N/A 9/23/2021    Procedure: COLONOSCOPY;  Surgeon: Dominic Bell MD;  Location: [facility]       Family History:    Family History   Problem Relation Age of Onset    Hypertension Mother     Breast Cancer Mother     Diabetes Father     Hypertension Father     Hyperlipidemia Father     Other Cancer Father     Diabetes Brother        Social History:  Marital Status:   [2]  Social History     Tobacco Use    Smoking status: Former     Types: Dip, chew, snus or snuff    Smokeless tobacco: Current     Types: Chew   Vaping Use    Vaping Use: Never used   Substance Use Topics    Alcohol use: Not Currently    Drug use: Never        Medications:    alcohol swab prep pads  aspirin 81 MG EC tablet  atorvastatin (LIPITOR) 40 MG tablet  betamethasone dipropionate (DIPROSONE) 0.05 % external cream  blood glucose (ONETOUCH VERIO IQ) test strip  dulaglutide  "(TRULICITY) 0.75 MG/0.5ML pen  lisinopril (ZESTRIL) 20 MG tablet  metFORMIN (GLUCOPHAGE-XR) 500 MG 24 hr tablet  nicotine (NICORETTE) 2 MG gum  thin (NO BRAND SPECIFIED) lancets  triamcinolone (KENALOG) 0.1 % external cream          Review of Systems   Constitutional: Negative.    Musculoskeletal:         Left foot pain and swelling   Skin: Negative.    All other systems reviewed and are negative.      Physical Exam   BP: (!) 153/88  Pulse: 71  Temp: 98.5  F (36.9  C)  Resp: 16  Height: 177.8 cm (5' 10\")  Weight: 77.1 kg (170 lb)  SpO2: 98 %      Physical Exam  Constitutional:       General: He is not in acute distress.     Appearance: Normal appearance. He is well-developed. He is not ill-appearing, toxic-appearing or diaphoretic.   HENT:      Head: Normocephalic and atraumatic.   Eyes:      Conjunctiva/sclera: Conjunctivae normal.   Cardiovascular:      Pulses: Normal pulses.   Pulmonary:      Effort: Pulmonary effort is normal.   Musculoskeletal:      Cervical back: Neck supple.      Left ankle: Normal. No swelling or ecchymosis. No tenderness. Normal range of motion.      Left foot: Decreased range of motion. Normal capillary refill. Swelling, tenderness and bony tenderness present. No deformity, laceration or crepitus.      Comments: Tenderness, swelling, and ecchymosis diffusely to the left great toe and into left dorsal lateral foot along the great toe metatarsal.  No breaks in the skin.  Decreased range of motion of the great toe.  No associated ankle tenderness or swelling.   Skin:     General: Skin is warm and dry.      Capillary Refill: Capillary refill takes less than 2 seconds.   Neurological:      General: No focal deficit present.      Mental Status: He is alert.      Sensory: Sensation is intact.      Motor: Motor function is intact.         ED Course                 Procedures      Results for orders placed or performed during the hospital encounter of 11/12/23 (from the past 24 hour(s))   Foot XR, " G/E 3 views, left    Narrative    EXAM: XR FOOT LEFT G/E 3 VIEWS  LOCATION: [facility]  DATE: 11/12/2023    INDICATION: injury to left foot, great toe pain and metatarsal pain  COMPARISON: None.      Impression    IMPRESSION: Small acute mildly displaced fracture of the medial aspect of the base of the proximal phalanx of the great toe. Small accessory navicular. No degenerative changes.       Medications - No data to display    Assessments & Plan (with Medical Decision Making)     Pt is a 63 year old male who presents to Urgent Care with complaints of left foot injury yesterday.  Patient states he got tangled up in his dog's leash and tripped and fell, injuring his left great toe and left foot.  He has had pain, swelling, and bruising throughout the left great toe and into the dorsal lateral foot.  Patient states he has increased pain with weightbearing.      Pt is afebrile on arrival.  Exam as above.  X-rays of left foot show a small acute mildly displaced fracture of the medial aspect of the base of the proximal phalanx of the great toe.  No other foot fractures.  Discussed results with patient.  Toe was stefania taped, and patient was placed in post-op shoe.  Orthopedic referral placed.  Encouraged symptomatic treatments at home.  Return precautions were reviewed.  Hand-outs were provided.    Patient was instructed to follow-up with orthopedics for continued care and management.  He is to return to the ED for persistent and/or worsening symptoms.  Patient expressed understanding of the diagnosis and plan and was discharged home in good condition.    I have reviewed the nursing notes.    I have reviewed the findings, diagnosis, plan and need for follow up with the patient.      Discharge Medication List as of 11/12/2023 11:33 AM          Final diagnoses:   Closed nondisplaced fracture of proximal phalanx of left great toe, initial encounter       11/12/2023   [facility]  EMERGENCY DEPT      Disclaimer:  This note consists of symbols derived from keyboarding, dictation and/or voice recognition software.  As a result, there may be errors in the script that have gone undetected.  Please consider this when interpreting information found in this chart.     Cyndee Jorgensen PA-C  11/12/23 1801

## 2023-11-12 NOTE — ED TRIAGE NOTES
Patient reports left big toe injury yesterday when he got tripped up with his dog.      Triage Assessment (Adult)       Row Name 11/12/23 0949          Triage Assessment    Airway WDL WDL        Respiratory WDL    Respiratory WDL WDL        Skin Circulation/Temperature WDL    Skin Circulation/Temperature WDL WDL        Cardiac WDL    Cardiac WDL WDL        Peripheral/Neurovascular WDL    Peripheral Neurovascular WDL WDL        Cognitive/Neuro/Behavioral WDL    Cognitive/Neuro/Behavioral WDL WDL

## 2023-11-19 ENCOUNTER — HEALTH MAINTENANCE LETTER (OUTPATIENT)
Age: 63
End: 2023-11-19

## 2023-12-26 ENCOUNTER — LAB (OUTPATIENT)
Dept: LAB | Facility: CLINIC | Age: 63
End: 2023-12-26
Payer: COMMERCIAL

## 2023-12-26 DIAGNOSIS — E11.9 DIABETES MELLITUS, TYPE 2 (H): Primary | ICD-10-CM

## 2023-12-26 LAB — HBA1C MFR BLD: 6.2 % (ref 0–5.6)

## 2023-12-26 PROCEDURE — 83036 HEMOGLOBIN GLYCOSYLATED A1C: CPT

## 2023-12-26 PROCEDURE — 36415 COLL VENOUS BLD VENIPUNCTURE: CPT

## 2024-03-10 DIAGNOSIS — E11.9 TYPE 2 DIABETES MELLITUS WITHOUT COMPLICATION, WITHOUT LONG-TERM CURRENT USE OF INSULIN (H): ICD-10-CM

## 2024-03-11 RX ORDER — DULAGLUTIDE 0.75 MG/.5ML
INJECTION, SOLUTION SUBCUTANEOUS
Qty: 6 ML | Refills: 3 | Status: SHIPPED | OUTPATIENT
Start: 2024-03-11 | End: 2024-06-27

## 2024-03-11 NOTE — TELEPHONE ENCOUNTER
Requested Prescriptions   Pending Prescriptions Disp Refills    TRULICITY 0.75 MG/0.5ML pen [Pharmacy Med Name: TRULICITY SD PEN 0.5ML 4'S 0.75MG 0.75MG] 6 mL 3     Sig: INJECT 0.75 MG UNDER THE SKIN EVERY 7 DAYS       GLP-1 Agonists Protocol Failed - 3/10/2024 11:09 PM        Failed - Recent (6 mo) or future (90 days) visit within the authorizing provider's specialty     The patient must have completed an in-person or virtual visit within the past 6 months or has a future visit scheduled within the next 90 days with the authorizing provider s specialty.  Urgent care and e-visits do not quality as an office visit for this protocol.          Passed - HgbA1C in past 3 or 6 months     If HgbA1C is 8 or greater, it needs to be on file within the past 3 months.  If less than 8, must be on file within the past 6 months.     Recent Labs   Lab Test 12/26/23  1331   A1C 6.2*             Passed - Medication is active on med list        Passed - Has GFR on file in past 12 months and most recent value is normal        Passed - Medication indicated for associated diagnosis     Medication is associated with one or more of the following diagnoses:     Type 2 diabetes mellitus           Passed - Patient is age 18 or older

## 2024-04-06 DIAGNOSIS — E11.9 TYPE 2 DIABETES MELLITUS WITHOUT COMPLICATION, WITHOUT LONG-TERM CURRENT USE OF INSULIN (H): ICD-10-CM

## 2024-04-07 ENCOUNTER — MYC REFILL (OUTPATIENT)
Dept: FAMILY MEDICINE | Facility: CLINIC | Age: 64
End: 2024-04-07
Payer: COMMERCIAL

## 2024-04-07 ENCOUNTER — HEALTH MAINTENANCE LETTER (OUTPATIENT)
Age: 64
End: 2024-04-07

## 2024-04-07 DIAGNOSIS — E11.9 TYPE 2 DIABETES MELLITUS WITHOUT COMPLICATION, WITHOUT LONG-TERM CURRENT USE OF INSULIN (H): ICD-10-CM

## 2024-04-08 RX ORDER — BLOOD SUGAR DIAGNOSTIC
STRIP MISCELLANEOUS
Qty: 200 STRIP | Refills: 1 | Status: SHIPPED | OUTPATIENT
Start: 2024-04-08

## 2024-04-08 RX ORDER — METFORMIN HCL 500 MG
1000 TABLET, EXTENDED RELEASE 24 HR ORAL 2 TIMES DAILY WITH MEALS
Qty: 360 TABLET | Refills: 1 | Status: SHIPPED | OUTPATIENT
Start: 2024-04-08 | End: 2024-06-27

## 2024-04-08 NOTE — TELEPHONE ENCOUNTER
"Requested Prescriptions   Pending Prescriptions Disp Refills    ONETOUCH VERIO IQ test strip [Pharmacy Med Name: OneTouch Verio test strips] 200 strip 1     Sig: Use to test blood sugar 2 times daily or as directed. Use for the one touch Verio meter       Diabetic Supplies Protocol Failed - 4/6/2024  8:02 AM        Failed - Recent (6 mo) or future (30 days) visit within the authorizing provider's specialty     Patient had office visit in the last 6 months or has a visit in the next 30 days with authorizing provider.  See \"Patient Info\" tab in inbasket, or \"Choose Columns\" in Meds & Orders section of the refill encounter.            Passed - Medication is active on med list        Passed - Medication indicated for associated diagnosis        Passed - Patient is 18 years of age or older             "

## 2024-06-26 SDOH — HEALTH STABILITY: PHYSICAL HEALTH
ON AVERAGE, HOW MANY DAYS PER WEEK DO YOU ENGAGE IN MODERATE TO STRENUOUS EXERCISE (LIKE A BRISK WALK)?: PATIENT DECLINED

## 2024-06-26 SDOH — HEALTH STABILITY: PHYSICAL HEALTH: ON AVERAGE, HOW MANY MINUTES DO YOU ENGAGE IN EXERCISE AT THIS LEVEL?: PATIENT DECLINED

## 2024-06-26 ASSESSMENT — SOCIAL DETERMINANTS OF HEALTH (SDOH): HOW OFTEN DO YOU GET TOGETHER WITH FRIENDS OR RELATIVES?: PATIENT DECLINED

## 2024-06-27 ENCOUNTER — APPOINTMENT (OUTPATIENT)
Dept: GENERAL RADIOLOGY | Facility: CLINIC | Age: 64
End: 2024-06-27
Attending: FAMILY MEDICINE
Payer: COMMERCIAL

## 2024-06-27 ENCOUNTER — OFFICE VISIT (OUTPATIENT)
Dept: FAMILY MEDICINE | Facility: CLINIC | Age: 64
End: 2024-06-27
Payer: COMMERCIAL

## 2024-06-27 ENCOUNTER — PATIENT OUTREACH (OUTPATIENT)
Dept: FAMILY MEDICINE | Facility: CLINIC | Age: 64
End: 2024-06-27

## 2024-06-27 ENCOUNTER — HOSPITAL ENCOUNTER (EMERGENCY)
Facility: CLINIC | Age: 64
Discharge: HOME | End: 2024-06-27
Attending: FAMILY MEDICINE | Admitting: FAMILY MEDICINE
Payer: COMMERCIAL

## 2024-06-27 VITALS
BODY MASS INDEX: 25.34 KG/M2 | SYSTOLIC BLOOD PRESSURE: 138 MMHG | DIASTOLIC BLOOD PRESSURE: 82 MMHG | OXYGEN SATURATION: 98 % | HEART RATE: 75 BPM | WEIGHT: 177 LBS | RESPIRATION RATE: 16 BRPM | HEIGHT: 70 IN | TEMPERATURE: 97.6 F

## 2024-06-27 VITALS
SYSTOLIC BLOOD PRESSURE: 124 MMHG | TEMPERATURE: 97.6 F | HEART RATE: 78 BPM | WEIGHT: 175 LBS | BODY MASS INDEX: 25.05 KG/M2 | OXYGEN SATURATION: 98 % | DIASTOLIC BLOOD PRESSURE: 81 MMHG | HEIGHT: 70 IN | RESPIRATION RATE: 16 BRPM

## 2024-06-27 DIAGNOSIS — Z00.00 ROUTINE GENERAL MEDICAL EXAMINATION AT A HEALTH CARE FACILITY: Primary | ICD-10-CM

## 2024-06-27 DIAGNOSIS — T14.8XXA PUNCTURE WOUND: ICD-10-CM

## 2024-06-27 DIAGNOSIS — R53.83 FATIGUE, UNSPECIFIED TYPE: ICD-10-CM

## 2024-06-27 DIAGNOSIS — E78.5 HYPERLIPIDEMIA LDL GOAL <70: ICD-10-CM

## 2024-06-27 DIAGNOSIS — L03.119 CELLULITIS OF FOOT: ICD-10-CM

## 2024-06-27 DIAGNOSIS — L03.115 CELLULITIS OF RIGHT LOWER EXTREMITY: ICD-10-CM

## 2024-06-27 DIAGNOSIS — E11.9 TYPE 2 DIABETES MELLITUS WITHOUT COMPLICATION, WITHOUT LONG-TERM CURRENT USE OF INSULIN (H): ICD-10-CM

## 2024-06-27 DIAGNOSIS — I10 BENIGN ESSENTIAL HYPERTENSION: ICD-10-CM

## 2024-06-27 LAB
ANION GAP SERPL CALCULATED.3IONS-SCNC: 12 MMOL/L (ref 7–15)
BUN SERPL-MCNC: 12.9 MG/DL (ref 8–23)
CALCIUM SERPL-MCNC: 9.1 MG/DL (ref 8.8–10.2)
CHLORIDE SERPL-SCNC: 103 MMOL/L (ref 98–107)
CHOLEST SERPL-MCNC: 199 MG/DL
CREAT SERPL-MCNC: 0.85 MG/DL (ref 0.67–1.17)
DEPRECATED HCO3 PLAS-SCNC: 22 MMOL/L (ref 22–29)
EGFRCR SERPLBLD CKD-EPI 2021: >90 ML/MIN/1.73M2
ERYTHROCYTE [DISTWIDTH] IN BLOOD BY AUTOMATED COUNT: 13.5 % (ref 10–15)
FASTING STATUS PATIENT QL REPORTED: NO
FASTING STATUS PATIENT QL REPORTED: NO
GLUCOSE SERPL-MCNC: 130 MG/DL (ref 70–99)
HBA1C MFR BLD: 6.3 % (ref 0–5.6)
HCT VFR BLD AUTO: 47.4 % (ref 40–53)
HDLC SERPL-MCNC: 38 MG/DL
HGB BLD-MCNC: 16 G/DL (ref 13.3–17.7)
LDLC SERPL CALC-MCNC: 132 MG/DL
MCH RBC QN AUTO: 30.1 PG (ref 26.5–33)
MCHC RBC AUTO-ENTMCNC: 33.8 G/DL (ref 31.5–36.5)
MCV RBC AUTO: 89 FL (ref 78–100)
NONHDLC SERPL-MCNC: 161 MG/DL
PLATELET # BLD AUTO: 253 10E3/UL (ref 150–450)
POTASSIUM SERPL-SCNC: 4.2 MMOL/L (ref 3.4–5.3)
RBC # BLD AUTO: 5.31 10E6/UL (ref 4.4–5.9)
SODIUM SERPL-SCNC: 137 MMOL/L (ref 135–145)
TRIGL SERPL-MCNC: 145 MG/DL
WBC # BLD AUTO: 13.7 10E3/UL (ref 4–11)

## 2024-06-27 PROCEDURE — 99396 PREV VISIT EST AGE 40-64: CPT | Mod: 25 | Performed by: NURSE PRACTITIONER

## 2024-06-27 PROCEDURE — 82570 ASSAY OF URINE CREATININE: CPT | Performed by: NURSE PRACTITIONER

## 2024-06-27 PROCEDURE — 85027 COMPLETE CBC AUTOMATED: CPT | Performed by: NURSE PRACTITIONER

## 2024-06-27 PROCEDURE — 83036 HEMOGLOBIN GLYCOSYLATED A1C: CPT | Performed by: NURSE PRACTITIONER

## 2024-06-27 PROCEDURE — 250N000013 HC RX MED GY IP 250 OP 250 PS 637: Performed by: FAMILY MEDICINE

## 2024-06-27 PROCEDURE — 10060 I&D ABSCESS SIMPLE/SINGLE: CPT | Performed by: FAMILY MEDICINE

## 2024-06-27 PROCEDURE — 76882 US LMTD JT/FCL EVL NVASC XTR: CPT | Mod: 26 | Performed by: FAMILY MEDICINE

## 2024-06-27 PROCEDURE — 99213 OFFICE O/P EST LOW 20 MIN: CPT | Mod: 25 | Performed by: NURSE PRACTITIONER

## 2024-06-27 PROCEDURE — 99284 EMERGENCY DEPT VISIT MOD MDM: CPT | Mod: 25 | Performed by: FAMILY MEDICINE

## 2024-06-27 PROCEDURE — 76882 US LMTD JT/FCL EVL NVASC XTR: CPT | Mod: RT | Performed by: FAMILY MEDICINE

## 2024-06-27 PROCEDURE — 80048 BASIC METABOLIC PNL TOTAL CA: CPT | Performed by: NURSE PRACTITIONER

## 2024-06-27 PROCEDURE — 82043 UR ALBUMIN QUANTITATIVE: CPT | Performed by: NURSE PRACTITIONER

## 2024-06-27 PROCEDURE — 80061 LIPID PANEL: CPT | Performed by: NURSE PRACTITIONER

## 2024-06-27 PROCEDURE — 36415 COLL VENOUS BLD VENIPUNCTURE: CPT | Performed by: NURSE PRACTITIONER

## 2024-06-27 PROCEDURE — 73630 X-RAY EXAM OF FOOT: CPT | Mod: RT

## 2024-06-27 RX ORDER — CIPROFLOXACIN 500 MG/1
500 TABLET, FILM COATED ORAL 2 TIMES DAILY
Qty: 20 TABLET | Refills: 0 | Status: SHIPPED | OUTPATIENT
Start: 2024-06-27 | End: 2024-07-07

## 2024-06-27 RX ORDER — METFORMIN HCL 500 MG
500 TABLET, EXTENDED RELEASE 24 HR ORAL 2 TIMES DAILY WITH MEALS
Qty: 360 TABLET | Refills: 1 | Status: SHIPPED | OUTPATIENT
Start: 2024-06-27

## 2024-06-27 RX ORDER — CEPHALEXIN 500 MG/1
500 CAPSULE ORAL 4 TIMES DAILY
Qty: 40 CAPSULE | Refills: 0 | Status: SHIPPED | OUTPATIENT
Start: 2024-06-27 | End: 2024-07-07

## 2024-06-27 RX ORDER — LISINOPRIL 10 MG/1
10 TABLET ORAL DAILY
Qty: 90 TABLET | Refills: 3 | Status: SHIPPED | OUTPATIENT
Start: 2024-06-27 | End: 2024-07-02

## 2024-06-27 RX ORDER — DULAGLUTIDE 0.75 MG/.5ML
0.75 INJECTION, SOLUTION SUBCUTANEOUS
Qty: 6 ML | Refills: 3 | Status: SHIPPED | OUTPATIENT
Start: 2024-06-27 | End: 2024-09-25

## 2024-06-27 RX ORDER — CEPHALEXIN 500 MG/1
500 CAPSULE ORAL ONCE
Status: COMPLETED | OUTPATIENT
Start: 2024-06-27 | End: 2024-06-27

## 2024-06-27 RX ORDER — ATORVASTATIN CALCIUM 40 MG/1
40 TABLET, FILM COATED ORAL DAILY
Qty: 90 TABLET | Refills: 3 | Status: SHIPPED | OUTPATIENT
Start: 2024-06-27

## 2024-06-27 RX ADMIN — CEPHALEXIN 500 MG: 500 CAPSULE ORAL at 09:36

## 2024-06-27 ASSESSMENT — ACTIVITIES OF DAILY LIVING (ADL)
ADLS_ACUITY_SCORE: 35
ADLS_ACUITY_SCORE: 35

## 2024-06-27 ASSESSMENT — PAIN SCALES - GENERAL: PAINLEVEL: SEVERE PAIN (7)

## 2024-06-27 ASSESSMENT — COLUMBIA-SUICIDE SEVERITY RATING SCALE - C-SSRS
1. IN THE PAST MONTH, HAVE YOU WISHED YOU WERE DEAD OR WISHED YOU COULD GO TO SLEEP AND NOT WAKE UP?: NO
2. HAVE YOU ACTUALLY HAD ANY THOUGHTS OF KILLING YOURSELF IN THE PAST MONTH?: NO
6. HAVE YOU EVER DONE ANYTHING, STARTED TO DO ANYTHING, OR PREPARED TO DO ANYTHING TO END YOUR LIFE?: NO

## 2024-06-27 NOTE — DISCHARGE INSTRUCTIONS
ICD-10-CM    1. Cellulitis of right lower extremity  L03.115     take keflex 500 mg orally four times daily.  if this fails to improve in 48  hours or significantly  worsens with fever, increased streaking up the leg, then return immed and would plan to extend antibiotics to cover possible vibrio and pseudomonas which are water-based bacteria.   orals that would cover both would include ciprofloaxacin which could be added to  the keflex (or its IV equivalent  ancef)      2. Puncture wound  T14.8XXA

## 2024-06-27 NOTE — TELEPHONE ENCOUNTER
What type of discharge? Emergency Department  Risk of Hospital admission or ED visit: MED%  Is a TCM episode required? No  When should the patient follow up with PCP? 7 days of discharge.

## 2024-06-27 NOTE — TELEPHONE ENCOUNTER
Transitions of Care Outreach  Chief Complaint   Patient presents with    ER F/U       Most Recent Admission Date: 6/27/2024   Most Recent Admission Diagnosis:      Most Recent Discharge Date: 6/27/2024   Most Recent Discharge Diagnosis: Cellulitis of right lower extremity - L03.115  Puncture wound - T14.8XXA     Transitions of Care Assessment    Discharge Assessment  How are you doing now that you are home?: saw today doing ok  How are your symptoms? (Red Flag symptoms escalate to triage hotline per guidelines): Unchanged  Do you know how to contact your clinic care team if you have future questions or changes to your health status? : Yes  Does the patient have their discharge instructions? : Yes  Does the patient have questions regarding their discharge instructions? : No  Were you started on any new medications or were there changes to any of your previous medications? : Yes  Does the patient have all of their medications?: Yes  Do you have questions regarding any of your medications? : No  Do you have all of your needed medical supplies or equipment (DME)?  (i.e. oxygen tank, CPAP, cane, etc.): No - What equipment or supplies are needed?    Follow up Plan     Discharge Follow-Up  Discharge follow up appointment scheduled in alignment with recommended follow up timeframe or Transitions of Risk Category? (Low = within 30 days; Moderate= within 14 days; High= within 7 days): Yes  Discharge Follow Up Appointment Date: 07/02/24  Discharge Follow Up Appointment Scheduled with?: Primary Care Provider    Future Appointments   Date Time Provider Department Center   6/27/2024  2:00 PM Edith Hay APRN CNP [facility]   7/2/2024 11:40 AM Edith Hay APRN CNP [facility]       Outpatient Plan as outlined on AVS reviewed with patient.    For any urgent concerns, please contact our 24 hour nurse triage line: [phone] ([phone])       Kayla Giron RN

## 2024-06-27 NOTE — PROGRESS NOTES
Preventive Care Visit  [facility]  HARSH Rowe CNP, Nurse Practitioner - Family  Jun 27, 2024      Assessment & Plan     Routine general medical examination at a health care facility  Healthy Male exam completed today.  I discussed preventative measures with the patient including continuing with lifestyle modifications of a balanced diet and regular cardiovascular exercise. I encouraged patient to follow-up yearly for annual physicals and sooner as needed.    Cellulitis of foot  Seen in the ER today. Started on antibiotic and has an additional antibiotic to start if not improving. He has strict follow up instructions from the ER is not improving by day 5. He may need IV antibiotic.     Fatigue, unspecified type  WBC elevated as expected with acute cellulitis of the foot. No anemia.   - CBC with platelets; Future  - CBC with platelets    Hyperlipidemia LDL goal <70  Encouraged to start the statin, he had not started it last year.   - atorvastatin (LIPITOR) 40 MG tablet; Take 1 tablet (40 mg) by mouth daily    Benign essential hypertension  BP is elevated but not severely. Will have him start the lisinopril at 10mg instead of 20mg daily. Plan rechecking kidney function in 1 month.   - lisinopril (ZESTRIL) 10 MG tablet; Take 1 tablet (10 mg) by mouth daily    Type 2 diabetes mellitus without complication, without long-term current use of insulin (H)  Doing well. Hemoglobin a1c is 6.3% today. Refills provided of medications.   - HEMOGLOBIN A1C; Future  - BASIC METABOLIC PANEL; Future  - Lipid panel reflex to direct LDL Non-fasting; Future  - Adult Eye  Referral; Future  - HEMOGLOBIN A1C  - BASIC METABOLIC PANEL  - Lipid panel reflex to direct LDL Non-fasting  - Albumin Random Urine Quantitative with Creat Ratio  - REVIEW OF HEALTH MAINTENANCE PROTOCOL ORDERS  - PRIMARY CARE FOLLOW-UP SCHEDULING; Future  - dulaglutide (TRULICITY) 0.75 MG/0.5ML pen; Inject 0.75 mg  "Subcutaneous every 7 days    Patient has been advised of split billing requirements and indicates understanding: Yes        BMI  Estimated body mass index is 25.4 kg/m  as calculated from the following:    Height as of this encounter: 1.778 m (5' 10\").    Weight as of this encounter: 80.3 kg (177 lb).     Counseling  Appropriate preventive services were discussed with this patient, including applicable screening as appropriate for fall prevention, nutrition, physical activity, Tobacco-use cessation, weight loss and cognition.  Checklist reviewing preventive services available has been given to the patient.  Reviewed patient's diet, addressing concerns and/or questions.   The patient was instructed to see the dentist every 6 months.           Subjective   Curtis is a 64 year old, presenting for the following:  Physical    Seen in the ER today for cellulitis of the right foot. Started on keflex qid for 10 days. If not improving in 1-2 days he is to start cipro. If still not improving by day 5 he is to return to the hospital for IV antibiotic. He is following up with me in one week.       6/22/2023     8:59 AM   Additional Questions   Roomed by Kayla HIGHTOWER        Health Care Directive  Patient does not have a Health Care Directive or Living Will: Discussed advance care planning with patient; however, patient declined at this time.    HPI    Diabetes Follow-up    How often are you checking your blood sugar? Two times daily  Blood sugar testing frequency justification:  Uncontrolled diabetes  What time of day are you checking your blood sugars (select all that apply)?  Before and after meals  Have you had any blood sugars above 200?  Yes 220  Have you had any blood sugars below 70?  No  What symptoms do you notice when your blood sugar is low?  Lethargy  What concerns do you have today about your diabetes? None and Other: making better food choices.      Do you have any of these symptoms? (Select all that apply)  No numbness or " tingling in feet.  No redness, sores or blisters on feet.  No complaints of excessive thirst.  No reports of blurry vision.  No significant changes to weight.          Hyperlipidemia Follow-Up    Are you regularly taking any medication or supplement to lower your cholesterol?   No, recommended to start the atorvastatin  Are you having muscle aches or other side effects that you think could be caused by your cholesterol lowering medication?  No    Hypertension Follow-up    Do you check your blood pressure regularly outside of the clinic? No   Are you following a low salt diet? No  Are your blood pressures ever more than 140 on the top number (systolic) OR more   than 90 on the bottom number (diastolic), for example 140/90? No    BP Readings from Last 2 Encounters:   06/27/24 138/82   06/27/24 124/81     Hemoglobin A1C (%)   Date Value   12/26/2023 6.2 (H)   06/22/2023 5.8 (H)     LDL Cholesterol Calculated (mg/dL)   Date Value   06/22/2023 110 (H)   07/07/2022 121 (H)     LDL Cholesterol Direct (mg/dL)   Date Value   03/25/2022 163 (H)           6/26/2024   General Health   How would you rate your overall physical health? Good   Feel stress (tense, anxious, or unable to sleep) Not at all            6/26/2024   Nutrition   Three or more servings of calcium each day? Yes   Diet: Regular (no restrictions)   How many servings of fruit and vegetables per day? (!) 2-3   How many sweetened beverages each day? 0-1            6/26/2024   Exercise   Days per week of moderate/strenous exercise Patient declined   Average minutes spent exercising at this level Patient declined            6/26/2024   Social Factors   Frequency of gathering with friends or relatives Patient declined   Worry food won't last until get money to buy more No   Food not last or not have enough money for food? No   Do you have housing? (Housing is defined as stable permanent housing and does not include staying ouside in a car, in a tent, in an abandoned  building, in an overnight shelter, or couch-surfing.) Yes   Are you worried about losing your housing? No   Lack of transportation? No   Unable to get utilities (heat,electricity)? No            6/26/2024   Fall Risk   Fallen 2 or more times in the past year? No   Trouble with walking or balance? No             6/26/2024   Dental   Dentist two times every year? (!) NO            6/26/2024   TB Screening   Were you born outside of the [place]? No            Today's PHQ-2 Score:       6/26/2024     3:24 PM   PHQ-2 ( 1999 [employer])   Q1: Little interest or pleasure in doing things 0   Q2: Feeling down, depressed or hopeless 0   PHQ-2 Score 0   Q1: Little interest or pleasure in doing things Not at all   Q2: Feeling down, depressed or hopeless Not at all   PHQ-2 Score 0           6/26/2024   Substance Use   Alcohol more than 3/day or more than 7/wk Not Applicable   Do you use any other substances recreationally? No        Social History     Tobacco Use    Smoking status: Former     Types: Dip, chew, snus or snuff    Smokeless tobacco: Current     Types: Chew   Vaping Use    Vaping status: Never Used   Substance Use Topics    Alcohol use: Not Currently    Drug use: Never           6/26/2024   STI Screening   New sexual partner(s) since last STI/HIV test? No      Last PSA:   Prostate Specific Antigen Screen   Date Value Ref Range Status   07/02/2018 0.6 0.0 - 3.5 ng/mL Final     ASCVD Risk   The 10-year ASCVD risk score (Aimee SALDAÑA, et al., 2019) is: 27.7%    Values used to calculate the score:      Age: 64 years      Sex: Male      Is Non- : No      Diabetic: Yes      Tobacco smoker: No      Systolic Blood Pressure: 138 mmHg      Is BP treated: Yes      HDL Cholesterol: 41 mg/dL      Total Cholesterol: 169 mg/dL         Reviewed and updated as needed this visit by Provider   Tobacco  Allergies  Meds  Problems  Med Hx  Surg Hx  Fam Hx            Past Medical History:   Diagnosis Date     Diabetes (H)      Past Surgical History:   Procedure Laterality Date    COLONOSCOPY N/A 9/23/2021    Procedure: COLONOSCOPY;  Surgeon: Dominic Bell MD;  Location: [facility]     Lab work is in process  Labs reviewed in EPIC  BP Readings from Last 3 Encounters:   06/27/24 138/82   06/27/24 124/81   11/12/23 (!) 153/88    Wt Readings from Last 3 Encounters:   06/27/24 80.3 kg (177 lb)   06/27/24 79.4 kg (175 lb)   11/12/23 77.1 kg (170 lb)                  Patient Active Problem List   Diagnosis    Dizziness    Dyspnea on exertion    Abnormal resting ECG findings    Diabetes mellitus, type 2 (H)     Past Surgical History:   Procedure Laterality Date    COLONOSCOPY N/A 9/23/2021    Procedure: COLONOSCOPY;  Surgeon: Dominic Bell MD;  Location: [facility]       Social History     Tobacco Use    Smoking status: Former     Types: Dip, chew, snus or snuff    Smokeless tobacco: Current     Types: Chew   Substance Use Topics    Alcohol use: Not Currently     Family History   Problem Relation Age of Onset    Hypertension Mother     Breast Cancer Mother     Diabetes Father     Hypertension Father     Hyperlipidemia Father     Other Cancer Father     Diabetes Brother          Current Outpatient Medications   Medication Sig Dispense Refill    alcohol swab prep pads Use to swab area of injection/brian as directed. 100 each 3    aspirin 81 MG EC tablet Take 1 tablet (81 mg) by mouth daily      betamethasone dipropionate (DIPROSONE) 0.05 % external cream Apply topically 2 times daily 45 g 1    cephALEXin (KEFLEX) 500 MG capsule Take 1 capsule (500 mg) by mouth 4 times daily for 10 days 40 capsule 0    ciprofloxacin (CIPRO) 500 MG tablet Take 1 tablet (500 mg) by mouth 2 times daily for 10 days start if not improving after 24-48 hours on keflex 20 tablet 0    metFORMIN (GLUCOPHAGE XR) 500 MG 24 hr tablet Take 2 tablets (1,000 mg) by mouth 2 times daily (with meals) 360 tablet 1    nicotine (NICORETTE) 2 MG gum Take 2 mg by mouth       ONETOUCH VERIO IQ test strip Use to test blood sugar 2 times daily or as directed. Use for the one touch Verio meter 200 strip 1    thin (NO BRAND SPECIFIED) lancets Use with lanceting device. To accompany: Blood Glucose Monitor Brands: per insurance. 100 each 6    triamcinolone (KENALOG) 0.1 % external cream Apply topically 2 times daily 45 g 1    TRULICITY 0.75 MG/0.5ML pen INJECT 0.75 MG UNDER THE SKIN EVERY 7 DAYS 6 mL 3    atorvastatin (LIPITOR) 40 MG tablet Take 1 tablet (40 mg) by mouth daily (Patient not taking: Reported on 6/27/2024) 90 tablet 3    lisinopril (ZESTRIL) 20 MG tablet Take 1 tablet (20 mg) by mouth daily (Patient not taking: Reported on 6/27/2024) 90 tablet 3     Allergies   Allergen Reactions    Amoxicillin Hives     Recent Labs   Lab Test 12/26/23  1331 06/22/23  0905 01/10/23  1137 10/07/22  1046 07/07/22  1015 03/28/22  0727 03/25/22  1444 06/23/20  1240 06/20/20  0057 06/20/20  0057 06/19/20  1725 06/07/20  1921   A1C 6.2* 5.8* 5.7*   < > 6.0*   < >  --   --   --   --   --   --    LDL  --  110*  --   --  121*  --  163*  --   --   --   --   --    HDL  --  41  --   --  39*  --  32*  --   --   --   --   --    TRIG  --  91  --   --  86  --  460*  --   --   --   --   --    ALT  --   --   --   --   --   --  31  --   --   --  30 22   CR  --  0.86  --   --   --   --  0.70 0.84   < > 0.95 1.01 0.90   GFRESTIMATED  --  >90  --   --   --   --  >90 >60   < > >60 80 >90   GFRESTBLACK  --   --   --   --   --   --   --  >60  --  >60 >90 >90   POTASSIUM  --  4.0  --   --   --   --  3.9 4.0   < > 3.8 4.0 3.6   TSH  --   --   --   --   --   --  1.21  --   --   --   --   --     < > = values in this interval not displayed.          Review of Systems    Constitutional, neuro, ENT, endocrine, pulmonary, cardiac, gastrointestinal, genitourinary, musculoskeletal, integument and psychiatric systems are negative, except as otherwise noted.       Objective    Exam  /82   Pulse 75   Temp 97.6  F (36.4  C)  "(Tympanic)   Resp 16   Ht 1.778 m (5' 10\")   Wt 80.3 kg (177 lb)   SpO2 98%   BMI 25.40 kg/m     Estimated body mass index is 25.4 kg/m  as calculated from the following:    Height as of this encounter: 1.778 m (5' 10\").    Weight as of this encounter: 80.3 kg (177 lb).    Physical Exam  GENERAL: alert and no distress  NECK: no adenopathy, no asymmetry, masses, or scars  RESP: lungs clear to auscultation - no rales, rhonchi or wheezes  CV: regular rate and rhythm, normal S1 S2, no S3 or S4, no murmur, click or rub, no peripheral edema  ABDOMEN: soft, nontender, no hepatosplenomegaly, no masses and bowel sounds normal  MS: no gross musculoskeletal defects noted, no edema  NEURO: Normal strength and tone, mentation intact and speech normal  PSYCH: mentation appears normal, affect normal/bright  Diabetic foot exam: normal DP and PT pulses, no trophic changes or ulcerative lesions, normal sensory exam, and normal monofilament exam, except for findings noted on diabetic foot graphical image.                  Signed Electronically by: HARSH Rowe CNP    "

## 2024-06-27 NOTE — PATIENT INSTRUCTIONS
"Patient Education   Preventive Care Advice   This is general advice we often give to help people stay healthy. Your care team may have specific advice just for you. Please talk to your care team about your own preventive care needs.  Lifestyle  Exercise at least 150 minutes each week (30 minutes a day, 5 days a week).  Do muscle strengthening activities 2 days a week. These help control your weight and prevent disease.  No smoking.  Wear sunscreen to prevent skin cancer.  Have your home tested for radon every 2 to 5 years. Radon is a colorless, odorless gas that can harm your lungs. To learn more, go to www.health.[facility].mn.us and search for \"Radon in Homes.\"  Keep guns unloaded and locked up in a safe place like a safe or gun vault, or, use a gun lock and hide the keys. Always lock away bullets separately. To learn more, visit [employer].gov and search for \"safe gun storage.\"  Nutrition  Eat 5 or more servings of fruits and vegetables each day.  Try wheat bread, brown rice and whole grain pasta (instead of white bread, rice, and pasta).  Get enough calcium and vitamin D. Check the label on foods and aim for 100% of the RDA (recommended daily allowance).  Regular exams  Have a dental exam and cleaning every 6 months.  See your health care team every year to talk about:  Any changes in your health.  Any medicines your care team has prescribed.  Preventive care, family planning, and ways to prevent chronic diseases.  Shots (vaccines)   HPV shots (up to age 26), if you've never had them before.  Hepatitis B shots (up to age 59), if you've never had them before.  COVID-19 shot: Get this shot when it's due.  Flu shot: Get a flu shot every year.  Tetanus shot: Get a tetanus shot every 10 years.  Pneumococcal, hepatitis A, and RSV shots: Ask your care team if you need these based on your risk.  Shingles shot (for age 50 and up).  General health tests  Diabetes screening:  Starting at age 35, Get screened for diabetes at least " every 3 years.  If you are younger than age 35, ask your care team if you should be screened for diabetes.  Cholesterol test: At age 39, start having a cholesterol test every 5 years, or more often if advised.  Bone density scan (DEXA): At age 50, ask your care team if you should have this scan for osteoporosis (brittle bones).  Hepatitis C: Get tested at least once in your life.  Abdominal aortic aneurysm screening: Talk to your doctor about having this screening if you:  Have ever smoked; and  Are biologically male; and  Are between the ages of 65 and 75.  STIs (sexually transmitted infections)  Before age 24: Ask your care team if you should be screened for STIs.  After age 24: Get screened for STIs if you're at risk. You are at risk for STIs (including HIV) if:  You are sexually active with more than one person.  You don't use condoms every time.  You or a partner was diagnosed with a sexually transmitted infection.  If you are at risk for HIV, ask about PrEP medicine to prevent HIV.  Get tested for HIV at least once in your life, whether you are at risk for HIV or not.  Cancer screening tests  Cervical cancer screening: If you have a cervix, begin getting regular cervical cancer screening tests at age 21. Most people who have regular screenings with normal results can stop after age 65. Talk about this with your provider.  Breast cancer scan (mammogram): If you've ever had breasts, begin having regular mammograms starting at age 40. This is a scan to check for breast cancer.  Colon cancer screening: It is important to start screening for colon cancer at age 45.  Have a colonoscopy test every 10 years (or more often if you're at risk) Or, ask your provider about stool tests like a FIT test every year or Cologuard test every 3 years.  To learn more about your testing options, visit: www.[employer]/722655.pdf.  For help making a decision, visit: patricia/ha98526.  Prostate cancer screening test: If you have a  prostate and are age 55 to 69, ask your provider if you would benefit from a yearly prostate cancer screening test.  Lung cancer screening: If you are a current or former smoker age 50 to 80, ask your care team if ongoing lung cancer screenings are right for you.  For informational purposes only. Not to replace the advice of your health care provider. Copyright   2023 [facility]. All rights reserved. Clinically reviewed by the [facility] Transitions Program. [employer] [id number] - REV 04/24.

## 2024-06-27 NOTE — ED NOTES
"Pt arrived via triage, ambulatory, in no acute distress.  Pt with mid planter swelling and pain with palpation, no redness around site, no puncture wound noted, no abrasions.   Red streak up top of foot, may or may not be related, pt thinks it is from his shoes.  Area of dry skin above ankle noted, pt reports  \"that has been there forever\".  Pt denies fever or chills, no nausea or vomiting, pt reports Td immunization up to date.   +3 DPP, no edema.       Plan:  Will await MD assessment and orders.  "

## 2024-06-27 NOTE — ED TRIAGE NOTES
Right foot infection starting Friday     Triage Assessment (Adult)       Row Name 06/27/24 0754          Triage Assessment    Airway WDL WDL        Respiratory WDL    Respiratory WDL WDL        Skin Circulation/Temperature WDL    Skin Circulation/Temperature WDL X        Cardiac WDL    Cardiac WDL WDL        Peripheral/Neurovascular WDL    Peripheral Neurovascular WDL WDL        Cognitive/Neuro/Behavioral WDL    Cognitive/Neuro/Behavioral WDL WDL

## 2024-06-27 NOTE — NURSING NOTE
"Initial /82   Pulse 75   Temp 97.6  F (36.4  C) (Tympanic)   Resp 16   Ht 1.778 m (5' 10\")   Wt 80.3 kg (177 lb)   SpO2 98%   BMI 25.40 kg/m   Estimated body mass index is 25.4 kg/m  as calculated from the following:    Height as of this encounter: 1.778 m (5' 10\").    Weight as of this encounter: 80.3 kg (177 lb). .    "

## 2024-06-27 NOTE — TELEPHONE ENCOUNTER
Pt has yearly physical today, I scheduled his 5 days follow up in a 20 min spot. Let me know if that is not ok.  Kayla Giron RN on 6/27/2024 at 1:03 PM

## 2024-06-27 NOTE — ED PROVIDER NOTES
History     Chief Complaint   Patient presents with    Wound Infection     Right foot infection- Started on Friday. Took some leftover antibiotics for 1 day.      HPI  Navdeep Dunn is a 64 year old male who presents with foot wound right foot base of the third toe proximal to the MTP joint small puncture that he experienced when [place] was [place]  He had pain at this area no obvious foreign body.  He developed localized swelling here and ultimately this morning noted a ascending lymphangitis over the anterior foot and leg that was mild.  No associated fever or systemic symptoms.  Well-controlled type 2 diabetes not on insulin.  A fullness and pain at the base of the third toe right foot.      Allergies:  Allergies   Allergen Reactions    Amoxicillin Hives       Problem List:    Patient Active Problem List    Diagnosis Date Noted    Diabetes mellitus, type 2 (H) 03/28/2022     Priority: Medium    Dyspnea on exertion      Priority: Medium    Abnormal resting ECG findings      Priority: Medium    Dizziness 06/19/2020     Priority: Medium        Past Medical History:    Past Medical History:   Diagnosis Date    Diabetes (H)        Past Surgical History:    Past Surgical History:   Procedure Laterality Date    COLONOSCOPY N/A 9/23/2021    Procedure: COLONOSCOPY;  Surgeon: Dominic Bell MD;  Location: [facility]       Family History:    Family History   Problem Relation Age of Onset    Hypertension Mother     Breast Cancer Mother     Diabetes Father     Hypertension Father     Hyperlipidemia Father     Other Cancer Father     Diabetes Brother        Social History:  Marital Status:   [2]  Social History     Tobacco Use    Smoking status: Former     Types: Dip, chew, snus or snuff    Smokeless tobacco: Current     Types: Chew   Vaping Use    Vaping status: Never Used   Substance Use Topics    Alcohol use: Not Currently    Drug use: Never        Medications:   "  cephALEXin (KEFLEX) 500 MG capsule  ciprofloxacin (CIPRO) 500 MG tablet  alcohol swab prep pads  aspirin 81 MG EC tablet  atorvastatin (LIPITOR) 40 MG tablet  betamethasone dipropionate (DIPROSONE) 0.05 % external cream  lisinopril (ZESTRIL) 20 MG tablet  metFORMIN (GLUCOPHAGE XR) 500 MG 24 hr tablet  nicotine (NICORETTE) 2 MG gum  ONETOUCH VERIO IQ test strip  thin (NO BRAND SPECIFIED) lancets  triamcinolone (KENALOG) 0.1 % external cream  TRULICITY 0.75 MG/0.5ML pen          Review of Systems    Physical Exam   BP: 124/81  Pulse: 78  Temp: 97.6  F (36.4  C)  Resp: 16  Height: 177.8 cm (5' 10\")  Weight: 79.4 kg (175 lb)  SpO2: 98 %      Physical Exam    The base of the right great toe plantar surface has a fullness tenderness induration of the skin mild erythema.  This is relatively well localized.  Over the surface of the dorsum and anterior ankle there is a very faint ascending lymphangitis.  Full range of motion of the ankle itself no involvement of any joints.  No other acute changes.  Normal dorsalis pedis posterior tibial pulses.  Normal distal coloration.  Endings of a more lichen simplex chronicus over the anterior shin.        [facility]    PROCEDURE: -Incision/Drainage    Date/Time: 6/27/2024 9:48 AM    Performed by: David Gold MD  Authorized by: David Gold MD    Risks, benefits and alternatives discussed.      LOCATION:    Location:  Lower extremity    PRE-PROCEDURE DETAILS:     Skin preparation:  Hibiclens    ANESTHESIA (see MAR for exact dosages):     Anesthesia method:  Local infiltration    Local anesthetic:  Lidocaine 1% WITH epi    PROCEDURE DETAILS:     Needle aspiration: yes      Needle size:  18 G    Incision types:  Single straight    Incision depth:  Subcutaneous    Drainage amount: none.    PROCEDURE    Patient Tolerance:  Patient tolerated the procedure well with no immediate complications                Critical Care time:  none      "          Results for orders placed or performed during the hospital encounter of 06/27/24 (from the past 24 hour(s))   Foot  XR, G/E 3 views, right    Narrative    RIGHT FOOT THREE OR MORE VIEWS  6/27/2024 9:11 AM     HISTORY: Evaluate for foreign body right plantar third toe MTP region.    COMPARISON: None.       Impression    IMPRESSION: There is no radiopaque foreign body identified with  attention to the plantar aspect of the third toe near the MTP region.  There is no evidence of an acute fracture. Joint spaces are  maintained.       Medications   cephALEXin (KEFLEX) capsule 500 mg (500 mg Oral $Given 6/27/24 0936)       Assessments & Plan (with Medical Decision Making)     mdm: Navdeep Dunn is a 64 year old male presenting with a puncture wound to the foot [place] unclear what he was punctured with no obvious retained foreign body x-ray used to confirm at least no radiopaque foreign body.  Ultrasound demonstrates no obvious abscess and I did needle the same area under local anesthesia receiving no fluid back with an 18-gauge needle in the same region.  This appears to be cellulitis only.  There is a risk in a diabetic with puncture wound in water of having atypical species such as vibrio and Pseudomonas.  Vibrio is primarily a salt water bug but stagnant freshwater could also grow this.  No history of MRSA.  He did use an antibiotic at home that he is unaware of what this was.  There were a couple of tablets that he took this Monday but then ran out it seemed to improve on this.  I started him on Keflex 500 mg 4 times daily that he will take for the next 7 days.  I did extend the course enough for a full 10-day course but he will follow-up at 5 days for recheck.  I suspect 7 days will be sufficient.  However we also discussed the potential for worsening in bacteria that were not covered by Keflex and told him that he would not improve in the next 24 hours and will  not see improvement until about 48 hours.  However this if this starts to worsen then he can add in the Cipro which is also ordered and I would run the 2 together for broader coverage if that were to occur.    I have reviewed the nursing notes.    I have reviewed the findings, diagnosis, plan and need for follow up with the patient.           Medical Decision Making  The patient's presentation was of moderate complexity (an acute complicated injury).    The patient's evaluation involved:  ordering and/or review of 1 test(s) in this encounter (see separate area of note for details)    The patient's management necessitated moderate risk (prescription drug management including medications given in the ED).        Discharge Medication List as of 6/27/2024  9:29 AM        START taking these medications    Details   cephALEXin (KEFLEX) 500 MG capsule Take 1 capsule (500 mg) by mouth 4 times daily for 10 days, Disp-40 capsule, R-0, E-Prescribe      ciprofloxacin (CIPRO) 500 MG tablet Take 1 tablet (500 mg) by mouth 2 times daily for 10 days start if not improving after 24-48 hours on keflex, Disp-20 tablet, R-0, E-Prescribe             Final diagnoses:   Cellulitis of right lower extremity - take keflex 500 mg orally four times daily.  if this fails to improve in 48  hours or significantly  worsens with fever, increased streaking up the leg, then return immed and would plan to extend antibiotics to cover possible vibrio and pseudomonas which are water-based bacteria.   orals that would cover both would include ciprofloaxacin which could be added to  the keflex (or its IV equivalent  ancef)   Puncture wound       6/27/2024   [facility] EMERGENCY DEPT       David Gold MD  06/27/24 [id number]

## 2024-07-02 ENCOUNTER — OFFICE VISIT (OUTPATIENT)
Dept: FAMILY MEDICINE | Facility: CLINIC | Age: 64
End: 2024-07-02
Payer: COMMERCIAL

## 2024-07-02 VITALS
BODY MASS INDEX: 25.91 KG/M2 | OXYGEN SATURATION: 97 % | HEART RATE: 62 BPM | HEIGHT: 70 IN | WEIGHT: 181 LBS | SYSTOLIC BLOOD PRESSURE: 146 MMHG | RESPIRATION RATE: 20 BRPM | TEMPERATURE: 97.4 F | DIASTOLIC BLOOD PRESSURE: 80 MMHG

## 2024-07-02 DIAGNOSIS — I10 BENIGN ESSENTIAL HYPERTENSION: ICD-10-CM

## 2024-07-02 DIAGNOSIS — L03.119 CELLULITIS OF FOOT: Primary | ICD-10-CM

## 2024-07-02 LAB
CREAT UR-MCNC: 121.9 MG/DL
MICROALBUMIN UR-MCNC: <12 MG/L
MICROALBUMIN/CREAT UR: NORMAL MG/G{CREAT}

## 2024-07-02 PROCEDURE — G2211 COMPLEX E/M VISIT ADD ON: HCPCS | Performed by: NURSE PRACTITIONER

## 2024-07-02 PROCEDURE — 99213 OFFICE O/P EST LOW 20 MIN: CPT | Mod: 24 | Performed by: NURSE PRACTITIONER

## 2024-07-02 RX ORDER — LISINOPRIL 20 MG/1
20 TABLET ORAL DAILY
Qty: 90 TABLET | Refills: 1 | Status: SHIPPED | OUTPATIENT
Start: 2024-07-02

## 2024-07-02 ASSESSMENT — PAIN SCALES - GENERAL: PAINLEVEL: NO PAIN (0)

## 2024-07-02 NOTE — PATIENT INSTRUCTIONS
Start the 20mg tab. Or double the 10mg. Come in to recheck BP with RN visit and get your labs done at that time as well. ([facility])    Follow up in about 6 months for diabetes recheck/ labs.       If your cellulitis of the foot is not improving let me know.

## 2024-07-02 NOTE — PROGRESS NOTES
"  Assessment & Plan     Cellulitis of foot  Improved with oral antibiotic. He has been doing well with this without significant side effects. Swelling has reduced along with pain.     Benign essential hypertension  Continues to be elevated. Recommend increase dose to 20mg from 10mg.  Follow up in 1 month for BP and BMP recheck with Lab and RN.   - lisinopril (ZESTRIL) 20 MG tablet; Take 1 tablet (20 mg) by mouth daily    BMI  Estimated body mass index is 25.97 kg/m  as calculated from the following:    Height as of this encounter: 1.778 m (5' 10\").    Weight as of this encounter: 82.1 kg (181 lb).       Subjective   Curtis is a 64 year old, presenting for the following health issues:  ER F/U        7/2/2024    11:25 AM   Additional Questions   Roomed by Gabriella FRANKLIN       Via the [employer] Maintenance questionnaire, the patient has reported the following services have been completed -Eye Exam: [facility] 2023-06-27, this information has been sent to the abstraction team.  History of Present Illness       Reason for visit:  Foot injury recheck    He eats 2-3 servings of fruits and vegetables daily.He consumes 0 sweetened beverage(s) daily.He exercises with enough effort to increase his heart rate 9 or less minutes per day.  He exercises with enough effort to increase his heart rate 3 or less days per week.   He is taking medications regularly.       ED/UC Followup:    Facility:  [facility]   Date of visit: 6/27/2024  Reason for visit: Cellulitis of right lower extremity, Puncture wound  Current Status: feeling a lot better    Has been on antibiotic. Did not need to return to ER for antibiotic change. [place] about 1 week symptoms had worsened and presented to the ER. Attempt to I&D wasn't successful as there was not a pocket of pus present. No systemic symptoms of illness have been present.       Review of Systems  Constitutional, HEENT, cardiovascular, pulmonary, gi and gu " "systems are negative, except as otherwise noted.      Objective    BP (!) 146/80   Pulse 62   Temp 97.4  F (36.3  C) (Tympanic)   Resp 20   Ht 1.778 m (5' 10\")   Wt 82.1 kg (181 lb)   SpO2 97%   BMI 25.97 kg/m    Body mass index is 25.97 kg/m .  Physical Exam   GENERAL: alert and no distress  RESP: lungs clear to auscultation - no rales, rhonchi or wheezes  CV: regular rate and rhythm, normal S1 S2, no S3 or S4, no murmur, click or rub, no peripheral edema  SKIN: cellulitis is resolved on bottom of right foot. No swelling or erythemia persists.           Signed Electronically by: HARSH Rowe CNP    "

## 2024-07-24 ENCOUNTER — TRANSFERRED RECORDS (OUTPATIENT)
Dept: HEALTH INFORMATION MANAGEMENT | Facility: CLINIC | Age: 64
End: 2024-07-24
Payer: COMMERCIAL

## 2024-07-24 LAB — RETINOPATHY: NEGATIVE

## 2024-08-19 ENCOUNTER — TELEPHONE (OUTPATIENT)
Dept: FAMILY MEDICINE | Facility: CLINIC | Age: 64
End: 2024-08-19
Payer: COMMERCIAL

## 2024-08-19 NOTE — TELEPHONE ENCOUNTER
Per fax received from [facility]   - Dulaglutide (TRULICITY) 0.75 MG/0.5ML pen  is not covered by patient's Insurance Company  L. Forslund - Please choose:  1.  Change medication that is not covered to a different medication and send new prescription to patient's pharmacy?  2.  Patient will need to pay for the non-covered medication out-of-pocket?   3.  Try for Prior Authorization with Insurance Company to get medication covered?        Key# [id number]

## 2024-08-20 NOTE — TELEPHONE ENCOUNTER
PA Initiation    Medication: TRULICITY 0.75 MG/0.5ML SC SOPN  Insurance Company: [employer]Specialty PA's - Phone [phone] Fax [phone]  Pharmacy Filling the Rx:    Filling Pharmacy Phone:    Filling Pharmacy Fax:    Start Date: 8/20/2024

## 2024-08-20 NOTE — TELEPHONE ENCOUNTER
Prior Authorization Approval    Medication: TRULICITY 0.75 MG/0.5ML SC SOPN  Authorization Effective Date: 7/21/2024  Authorization Expiration Date: 8/20/2025  Approved Dose/Quantity: Trulicity 0.75mg/0.5ml  Reference #: [id number]   Insurance Company: [employer]Specialty PA's - Phone [phone] Fax [phone]  Which Pharmacy is filling the prescription: [facility] [id number] [facility]  Pharmacy Notified: yes  Patient Notified: no

## 2024-09-25 ENCOUNTER — MYC REFILL (OUTPATIENT)
Dept: FAMILY MEDICINE | Facility: CLINIC | Age: 64
End: 2024-09-25
Payer: COMMERCIAL

## 2024-09-25 DIAGNOSIS — E11.9 TYPE 2 DIABETES MELLITUS WITHOUT COMPLICATION, WITHOUT LONG-TERM CURRENT USE OF INSULIN (H): ICD-10-CM

## 2024-09-25 RX ORDER — DULAGLUTIDE 0.75 MG/.5ML
0.75 INJECTION, SOLUTION SUBCUTANEOUS
Qty: 6 ML | Refills: 2 | Status: SHIPPED | OUTPATIENT
Start: 2024-09-25

## 2024-09-25 NOTE — TELEPHONE ENCOUNTER
Pharmacy transfer.   Prescription approved per [facility] Refill Protocol.  Julie Behrendt RN

## 2024-11-03 ENCOUNTER — HEALTH MAINTENANCE LETTER (OUTPATIENT)
Age: 64
End: 2024-11-03

## 2025-03-01 ENCOUNTER — HEALTH MAINTENANCE LETTER (OUTPATIENT)
Age: 65
End: 2025-03-01

## 2025-04-30 ENCOUNTER — TELEPHONE (OUTPATIENT)
Dept: FAMILY MEDICINE | Facility: CLINIC | Age: 65
End: 2025-04-30
Payer: COMMERCIAL

## 2025-04-30 NOTE — LETTER
[facility]  [address]  [facility] [address]  [phone]  April 30, 2025    Navdeep Dunn  [address] [place] [id number]    Dear Navdeep,    We care about your health and have reviewed your health plan including your medical conditions, medication list, and lab results.  Based on this review, it is recommended that you follow up regarding the following health topic(s):     -Diabetes & Hypertension Visit - Office Visit Appointment Needed    Please call us at [phone] (or use [facility]) to address the above recommendations.     Thank you for trusting [facility] and we appreciate the opportunity to serve you.  We look forward to supporting your healthcare needs in the future.    Healthy Regards,      Your Health Care Team  [facility]

## 2025-05-28 ENCOUNTER — PATIENT OUTREACH (OUTPATIENT)
Dept: CARE COORDINATION | Facility: CLINIC | Age: 65
End: 2025-05-28
Payer: COMMERCIAL

## 2025-06-21 ENCOUNTER — HEALTH MAINTENANCE LETTER (OUTPATIENT)
Age: 65
End: 2025-06-21

## 2025-06-23 SDOH — HEALTH STABILITY: PHYSICAL HEALTH: ON AVERAGE, HOW MANY MINUTES DO YOU ENGAGE IN EXERCISE AT THIS LEVEL?: 20 MIN

## 2025-06-23 SDOH — HEALTH STABILITY: PHYSICAL HEALTH: ON AVERAGE, HOW MANY DAYS PER WEEK DO YOU ENGAGE IN MODERATE TO STRENUOUS EXERCISE (LIKE A BRISK WALK)?: 2 DAYS

## 2025-06-23 ASSESSMENT — SOCIAL DETERMINANTS OF HEALTH (SDOH): HOW OFTEN DO YOU GET TOGETHER WITH FRIENDS OR RELATIVES?: ONCE A WEEK

## 2025-06-24 ENCOUNTER — OFFICE VISIT (OUTPATIENT)
Dept: FAMILY MEDICINE | Facility: CLINIC | Age: 65
End: 2025-06-24
Payer: COMMERCIAL

## 2025-06-24 VITALS
HEART RATE: 72 BPM | SYSTOLIC BLOOD PRESSURE: 158 MMHG | DIASTOLIC BLOOD PRESSURE: 90 MMHG | RESPIRATION RATE: 18 BRPM | WEIGHT: 181.2 LBS | TEMPERATURE: 97.5 F | OXYGEN SATURATION: 95 % | HEIGHT: 70 IN | BODY MASS INDEX: 25.94 KG/M2

## 2025-06-24 DIAGNOSIS — Z12.5 SCREENING FOR PROSTATE CANCER: ICD-10-CM

## 2025-06-24 DIAGNOSIS — Z00.00 ENCOUNTER FOR MEDICARE ANNUAL WELLNESS EXAM: Primary | ICD-10-CM

## 2025-06-24 DIAGNOSIS — I10 BENIGN ESSENTIAL HYPERTENSION: ICD-10-CM

## 2025-06-24 DIAGNOSIS — G89.29 CHRONIC LEFT SHOULDER PAIN: ICD-10-CM

## 2025-06-24 DIAGNOSIS — Z13.6 SCREENING FOR AAA (ABDOMINAL AORTIC ANEURYSM): ICD-10-CM

## 2025-06-24 DIAGNOSIS — E11.9 TYPE 2 DIABETES MELLITUS WITHOUT COMPLICATION, WITHOUT LONG-TERM CURRENT USE OF INSULIN (H): ICD-10-CM

## 2025-06-24 DIAGNOSIS — H91.93 HEARING REDUCED, BILATERAL: ICD-10-CM

## 2025-06-24 DIAGNOSIS — M25.512 CHRONIC LEFT SHOULDER PAIN: ICD-10-CM

## 2025-06-24 PROBLEM — R42 DIZZINESS: Status: RESOLVED | Noted: 2020-06-19 | Resolved: 2025-06-24

## 2025-06-24 LAB
ANION GAP SERPL CALCULATED.3IONS-SCNC: 17 MMOL/L (ref 7–15)
BUN SERPL-MCNC: 9.6 MG/DL (ref 8–23)
CALCIUM SERPL-MCNC: 9 MG/DL (ref 8.8–10.4)
CHLORIDE SERPL-SCNC: 105 MMOL/L (ref 98–107)
CHOLEST SERPL-MCNC: 175 MG/DL
CREAT SERPL-MCNC: 0.93 MG/DL (ref 0.67–1.17)
EGFRCR SERPLBLD CKD-EPI 2021: >90 ML/MIN/1.73M2
EST. AVERAGE GLUCOSE BLD GHB EST-MCNC: 148 MG/DL
FASTING STATUS PATIENT QL REPORTED: YES
FASTING STATUS PATIENT QL REPORTED: YES
GLUCOSE SERPL-MCNC: 181 MG/DL (ref 70–99)
HBA1C MFR BLD: 6.8 % (ref 0–5.6)
HCO3 SERPL-SCNC: 16 MMOL/L (ref 22–29)
HDLC SERPL-MCNC: 38 MG/DL
LDLC SERPL CALC-MCNC: 110 MG/DL
NONHDLC SERPL-MCNC: 137 MG/DL
POTASSIUM SERPL-SCNC: 4.2 MMOL/L (ref 3.4–5.3)
PSA SERPL DL<=0.01 NG/ML-MCNC: 0.52 NG/ML (ref 0–4.5)
SODIUM SERPL-SCNC: 138 MMOL/L (ref 135–145)
TRIGL SERPL-MCNC: 134 MG/DL

## 2025-06-24 PROCEDURE — G0103 PSA SCREENING: HCPCS | Performed by: NURSE PRACTITIONER

## 2025-06-24 PROCEDURE — 3080F DIAST BP >= 90 MM HG: CPT | Performed by: NURSE PRACTITIONER

## 2025-06-24 PROCEDURE — 36415 COLL VENOUS BLD VENIPUNCTURE: CPT | Performed by: NURSE PRACTITIONER

## 2025-06-24 PROCEDURE — 80061 LIPID PANEL: CPT | Performed by: NURSE PRACTITIONER

## 2025-06-24 PROCEDURE — 3044F HG A1C LEVEL LT 7.0%: CPT | Performed by: NURSE PRACTITIONER

## 2025-06-24 PROCEDURE — G0402 INITIAL PREVENTIVE EXAM: HCPCS | Mod: 25 | Performed by: NURSE PRACTITIONER

## 2025-06-24 PROCEDURE — 3077F SYST BP >= 140 MM HG: CPT | Performed by: NURSE PRACTITIONER

## 2025-06-24 PROCEDURE — 83036 HEMOGLOBIN GLYCOSYLATED A1C: CPT | Performed by: NURSE PRACTITIONER

## 2025-06-24 PROCEDURE — 99214 OFFICE O/P EST MOD 30 MIN: CPT | Performed by: NURSE PRACTITIONER

## 2025-06-24 PROCEDURE — G2211 COMPLEX E/M VISIT ADD ON: HCPCS | Performed by: NURSE PRACTITIONER

## 2025-06-24 PROCEDURE — 80048 BASIC METABOLIC PNL TOTAL CA: CPT | Performed by: NURSE PRACTITIONER

## 2025-06-24 PROCEDURE — 1126F AMNT PAIN NOTED NONE PRSNT: CPT | Performed by: NURSE PRACTITIONER

## 2025-06-24 RX ORDER — ROSUVASTATIN CALCIUM 10 MG/1
10 TABLET, COATED ORAL DAILY
Qty: 90 TABLET | Refills: 3 | Status: SHIPPED | OUTPATIENT
Start: 2025-06-24

## 2025-06-24 RX ORDER — LISINOPRIL 20 MG/1
20 TABLET ORAL DAILY
Qty: 90 TABLET | Refills: 1 | Status: SHIPPED | OUTPATIENT
Start: 2025-06-24

## 2025-06-24 RX ORDER — DULAGLUTIDE 0.75 MG/.5ML
0.75 INJECTION, SOLUTION SUBCUTANEOUS
Qty: 9 ML | Refills: 3 | Status: SHIPPED | OUTPATIENT
Start: 2025-07-07

## 2025-06-24 ASSESSMENT — PAIN SCALES - GENERAL: PAINLEVEL_OUTOF10: NO PAIN (0)

## 2025-06-24 NOTE — PROGRESS NOTES
Preventive Care Visit  [facility]  HARSH Rowe CNP, Nurse Practitioner - Family  Jun 24, 2025      Assessment & Plan     Encounter for Medicare annual wellness exam  Wellness exam completed today. Chronic illnesses reviewed as below.     Type 2 diabetes mellitus without complication, without long-term current use of insulin (H)  Continues to be well controlled. Hemoglobin a1c is 6.8% today. Okay to continue to hold metformin if desired. He does not wish to increase trulicity dose but will monitor and watch his diet. Remaining labs are pending.   - HEMOGLOBIN A1C; Future  - BASIC METABOLIC PANEL; Future  - Lipid panel reflex to direct LDL Non-fasting; Future  - Albumin Random Urine Quantitative with Creat Ratio; Future  - Adult Eye  Referral; Future  - rosuvastatin (CRESTOR) 10 MG tablet; Take 1 tablet (10 mg) by mouth daily.  - FOOT EXAM  - HEMOGLOBIN A1C  - BASIC METABOLIC PANEL  - Lipid panel reflex to direct LDL Non-fasting  - Albumin Random Urine Quantitative with Creat Ratio  - dulaglutide (TRULICITY) 0.75 MG/0.5ML pen; Inject 0.75 mg subcutaneously every 7 days.    Screening for AAA (abdominal aortic aneurysm)  - US Abdominal Aorta; Future    Hearing reduced, bilateral  - Adult Audiology Referral; Future    Screening for prostate cancer  - PSA, screen; Future  - PSA, screen    Benign essential hypertension  Elevated today, recommended restarting his lisinopril. Follow up for BP recheck in about 1 month.   - lisinopril (ZESTRIL) 20 MG tablet; Take 1 tablet (20 mg) by mouth daily.    Chronic left shoulder pain  Some arthritis noted in the shoulder not noted enough to explain symptoms. Recommend MRI to evaluate for possible rotator cuff tear  - XR Shoulder Left G/E 3 Views; Future  - MR Shoulder Left w/o Contrast; Future    Patient has been advised of split billing requirements and indicates understanding: Yes    The longitudinal plan of care for the  "diagnosis(es)/condition(s) as documented were addressed during this visit. Due to the added complexity in care, I will continue to support Curtis in the subsequent management and with ongoing continuity of care.      BMI  Estimated body mass index is 26 kg/m  as calculated from the following:    Height as of this encounter: 1.778 m (5' 10\").    Weight as of this encounter: 82.2 kg (181 lb 3.2 oz).       Counseling  Appropriate preventive services were addressed with this patient via screening, questionnaire, or discussion as appropriate for fall prevention, nutrition, physical activity, Tobacco-use cessation, social engagement, weight loss and cognition.  Checklist reviewing preventive services available has been given to the patient.  Reviewed patient's diet, addressing concerns and/or questions.   He is at risk for lack of exercise and has been provided with information to increase physical activity for the benefit of his well-being.   Discussed possible causes of fatigue. The patient was provided with written information regarding signs of hearing loss.       Subjective   Curtis is a 65 year old, presenting for the following:  Wellness Visit        6/24/2025     9:43 AM   Additional Questions   Roomed by Julianna YOUNGBLOOD MA          HPI  Mentions left shoulder pain, up and back is the worse especially at a 90 degrees or greater. Pain has been present for 2-3 months. He does not recall injury but has reduced range of motion. Pain is over the top of the shoulder.       Diabetes Follow-up  How often are you checking your blood sugar? A few times a week  What time of day are you checking your blood sugars (select all that apply)?  Before meals  Have you had any blood sugars above 200?  Yes   Have you had any blood sugars below 70?  No  What symptoms do you notice when your blood sugar is low?  Shaky, Dizzy, and Weak  What concerns do you have today about your diabetes? None   Do you have any of these symptoms? (Select all that " "apply)  No numbness or tingling in feet.  No redness, sores or blisters on feet.  No complaints of excessive thirst.  No reports of blurry vision.  No significant changes to weight.  Have you had a diabetic eye exam in the last 12 months? No  Doses > 500mg have caused diarrhea. He has been off all oral medication since December. \"I got out of the habit\". If he takes more than 500mg he has diarrhea.       Hyperlipidemia Follow-Up  Are you regularly taking any medication or supplement to lower your cholesterol?   No  Are you having muscle aches or other side effects that you think could be caused by your cholesterol lowering medication?  No  --- has had myalgias from atorvastatin, he stopped and symptoms improved.     Hypertension Follow-up  Do you check your blood pressure regularly outside of the clinic? No   Are you following a low salt diet? No  Are your blood pressures ever more than 140 on the top number (systolic) OR more   than 90 on the bottom number (diastolic), for example 140/90? Yes    BP Readings from Last 2 Encounters:   06/24/25 (!) 158/90   07/02/24 (!) 146/80     Hemoglobin A1C (%)   Date Value   06/27/2024 6.3 (H)   12/26/2023 6.2 (H)     LDL Cholesterol Calculated (mg/dL)   Date Value   06/27/2024 132 (H)   06/22/2023 110 (H)     LDL Cholesterol Direct (mg/dL)   Date Value   03/25/2022 163 (H)       Advance Care Planning    Discussed advance care planning with patient; however, patient declined at this time.        6/23/2025   General Health   How would you rate your overall physical health? Good   Feel stress (tense, anxious, or unable to sleep) Not at all         6/23/2025   Nutrition   Diet: Regular (no restrictions)         6/23/2025   Exercise   Days per week of moderate/strenous exercise 2 days   Average minutes spent exercising at this level 20 min   (!) EXERCISE CONCERN      6/23/2025   Social Factors   Frequency of gathering with friends or relatives Once a week   Worry food won't last " until get money to buy more No   Food not last or not have enough money for food? No   Do you have housing? (Housing is defined as stable permanent housing and does not include staying outside in a car, in a tent, in an abandoned building, in an overnight shelter, or couch-surfing.) Yes   Are you worried about losing your housing? No   Lack of transportation? No   Unable to get utilities (heat,electricity)? No         6/24/2025   Fall Risk   Gait Speed Test (Document in seconds) 3.41   Gait Speed Test Interpretation Less than or equal to 5.00 seconds - PASS          6/23/2025   Activities of Daily Living- Home Safety   Needs help with the following daily activites None of the above   Safety concerns in the home None of the above         6/23/2025   Dental   Dentist two times every year? (!) DECLINE         6/23/2025   Hearing Screening   Hearing concerns? (!) I NEED TO ASK PEOPLE TO SPEAK UP OR REPEAT THEMSELVES.   Would you like a referral for hearing testing? (!) YES         6/23/2025   Driving Risk Screening   Patient/family members have concerns about driving No         6/23/2025   General Alertness/Fatigue Screening   Have you been more tired than usual lately? (!) YES         6/23/2025   Urinary Incontinence Screening   Bothered by leaking urine in past 6 months No         Today's PHQ-2 Score:       6/23/2025    11:15 AM   PHQ-2 ( 1999 [employer])   Q1: Little interest or pleasure in doing things 0   Q2: Feeling down, depressed or hopeless 0   PHQ-2 Score 0    Q1: Little interest or pleasure in doing things Not at all   Q2: Feeling down, depressed or hopeless Not at all   PHQ-2 Score 0       Patient-reported           6/23/2025   Substance Use   Alcohol more than 3/day or more than 7/wk Not Applicable   Do you have a current opioid prescription? No   How severe/bad is pain from 1 to 10? 0/10 (No Pain)   Do you use any other substances recreationally? No     Social History     Tobacco Use    Smoking status: Former      Types: Dip, chew, snus or snuff    Smokeless tobacco: Current     Types: Chew   Vaping Use    Vaping status: Never Used   Substance Use Topics    Alcohol use: Not Currently    Drug use: Never       Last PSA:   Prostate Specific Antigen Screen   Date Value Ref Range Status   07/02/2018 0.6 0.0 - 3.5 ng/mL Final     ASCVD Risk   The 10-year ASCVD risk score (Aimee SALDAÑA, et al., 2019) is: 41.1%    Values used to calculate the score:      Age: 65 years      Sex: Male      Is Non- : No      Diabetic: Yes      Tobacco smoker: No      Systolic Blood Pressure: 158 mmHg      Is BP treated: Yes      HDL Cholesterol: 38 mg/dL      Total Cholesterol: 199 mg/dL          Reviewed and updated as needed this visit by Provider                    Past Medical History:   Diagnosis Date    Diabetes (H)      Past Surgical History:   Procedure Laterality Date    COLONOSCOPY N/A 9/23/2021    Procedure: COLONOSCOPY;  Surgeon: Dominic Bell MD;  Location: [facility]     Lab work is in process  Labs reviewed in EPIC  BP Readings from Last 3 Encounters:   06/24/25 (!) 158/90   07/02/24 (!) 146/80   06/27/24 138/82    Wt Readings from Last 3 Encounters:   06/24/25 82.2 kg (181 lb 3.2 oz)   07/02/24 82.1 kg (181 lb)   06/27/24 80.3 kg (177 lb)                  Patient Active Problem List   Diagnosis    Abnormal resting ECG findings    Diabetes mellitus, type 2 (H)     Past Surgical History:   Procedure Laterality Date    COLONOSCOPY N/A 9/23/2021    Procedure: COLONOSCOPY;  Surgeon: Dominic Bell MD;  Location: [facility]       Social History     Tobacco Use    Smoking status: Former     Types: Dip, chew, snus or snuff    Smokeless tobacco: Current     Types: Chew   Substance Use Topics    Alcohol use: Not Currently     Family History   Problem Relation Age of Onset    Hypertension Mother     Breast Cancer Mother     Diabetes Father     Hypertension Father     Hyperlipidemia Father     Other Cancer Father      Diabetes Brother          Current Outpatient Medications   Medication Sig Dispense Refill    alcohol swab prep pads Use to swab area of injection/brian as directed. 100 each 3    aspirin 81 MG EC tablet Take 1 tablet (81 mg) by mouth daily      betamethasone dipropionate (DIPROSONE) 0.05 % external cream Apply topically 2 times daily 45 g 1    dulaglutide (TRULICITY) 0.75 MG/0.5ML pen Inject 0.75 mg subcutaneously every 7 days. 6 mL 2    nicotine (NICORETTE) 2 MG gum Take 2 mg by mouth      ONETOUCH VERIO IQ test strip Use to test blood sugar 2 times daily or as directed. Use for the one touch Verio meter 200 strip 1    rosuvastatin (CRESTOR) 10 MG tablet Take 1 tablet (10 mg) by mouth daily. 90 tablet 3    thin (NO BRAND SPECIFIED) lancets Use with lanceting device. To accompany: Blood Glucose Monitor Brands: per insurance. 100 each 6    lisinopril (ZESTRIL) 20 MG tablet Take 1 tablet (20 mg) by mouth daily (Patient not taking: Reported on 6/24/2025) 90 tablet 1    metFORMIN (GLUCOPHAGE XR) 500 MG 24 hr tablet Take 1 tablet (500 mg) by mouth 2 times daily (with meals) (Patient not taking: Reported on 6/24/2025) 360 tablet 1    triamcinolone (KENALOG) 0.1 % external cream Apply topically 2 times daily (Patient not taking: Reported on 6/24/2025) 45 g 1     Allergies   Allergen Reactions    Amoxicillin Hives     Recent Labs   Lab Test 06/27/24  1352 12/26/23  1331 06/22/23  0905 10/07/22  1046 07/07/22  1015 03/28/22  0727 03/25/22  1444 06/23/20  1240 06/20/20  0057 06/20/20  0057 06/19/20  1725 06/07/20  1921 07/02/18  1849   A1C 6.3* 6.2* 5.8*   < > 6.0*   < >  --   --   --   --   --   --   --    *  --  110*  --  121*  --  163*  --   --   --   --   --   --    HDL 38*  --  41  --  39*  --  32*  --   --   --   --   --   --    TRIG 145  --  91  --  86  --  460*  --   --   --   --   --   --    ALT  --   --   --   --   --   --  31  --   --   --  30 22  --    CR 0.85  --  0.86  --   --   --  0.70 0.84   < > 0.95  1.01 0.90  --    GFRESTIMATED >90  --  >90  --   --   --  >90 >60   < > >60 80 >90   < >   GFRESTBLACK  --   --   --   --   --   --   --  >60  --  >60 >90 >90  --    POTASSIUM 4.2  --  4.0  --   --   --  3.9 4.0   < > 3.8 4.0 3.6  --    TSH  --   --   --   --   --   --  1.21  --   --   --   --   --   --     < > = values in this interval not displayed.           Current providers sharing in care for this patient include:  Patient Care Team:  Edith Hay APRN CNP as PCP - General (Nurse Practitioner - Family)  Edith Hay APRN CNP as Assigned PCP  Wendy Lyons, RN as Diabetes Educator (Diabetes Education)    The following health maintenance items are reviewed in Epic and correct as of today:  Health Maintenance   Topic Date Due    PNEUMOCOCCAL VACCINE 50+ YEARS (1 of 2 - PCV) Never done    ZOSTER VACCINE (1 of 2) Never done    LUNG CANCER SCREENING  Never done    RSV VACCINE (1 - Risk 60-74 years 1-dose series) Never done    COVID-19 VACCINE (2 - 2024-25 season) 09/01/2024    A1C  09/27/2024    AORTIC ANEURYSM SCREENING (SYSTEM ASSIGNED)  Never done    MEDICARE ANNUAL WELLNESS VISIT  06/27/2025    BMP  06/27/2025    LIPID  06/27/2025    MICROALBUMIN  06/27/2025    DIABETIC FOOT EXAM  06/27/2025    ANNUAL REVIEW OF HM ORDERS  06/27/2025    EYE EXAM  07/24/2025    INFLUENZA VACCINE (Season Ended) 09/01/2025    FALL RISK ASSESSMENT  06/24/2026    DTAP/TDAP/TD VACCINE (2 - Td or Tdap) 07/03/2028    ADVANCE CARE PLANNING  06/27/2029    COLORECTAL CANCER SCREENING  09/23/2031    HEPATITIS C SCREENING  Completed    HIV SCREENING  Completed    PHQ-2 (once per calendar year)  Completed    HPV VACCINE  Aged Out    MENINGITIS VACCINE  Aged Out         Review of Systems  Constitutional, neuro, ENT, endocrine, pulmonary, cardiac, gastrointestinal, genitourinary, musculoskeletal, integument and psychiatric systems are negative, except as otherwise noted.     Objective    Exam  BP (!) 158/90   Pulse 72   Temp 97.5  F  "(36.4  C) (Tympanic)   Resp 18   Ht 1.778 m (5' 10\")   Wt 82.2 kg (181 lb 3.2 oz)   SpO2 95%   BMI 26.00 kg/m     Estimated body mass index is 26 kg/m  as calculated from the following:    Height as of this encounter: 1.778 m (5' 10\").    Weight as of this encounter: 82.2 kg (181 lb 3.2 oz).    Physical Exam  GENERAL: alert and no distress  EYES: Eyes grossly normal to inspection, PERRL and conjunctivae and sclerae normal  HENT: ear canals and TM's normal, nose and mouth without ulcers or lesions  NECK: no adenopathy, no asymmetry, masses, or scars  RESP: lungs clear to auscultation - no rales, rhonchi or wheezes  CV: regular rate and rhythm, normal S1 S2, no S3 or S4, no murmur, click or rub, no peripheral edema  ABDOMEN: soft, nontender, no hepatosplenomegaly, no masses and bowel sounds normal  MS: no gross musculoskeletal defects noted, no edema  SKIN: no suspicious lesions or rashes  NEURO: Normal strength and tone, mentation intact and speech normal  PSYCH: mentation appears normal, affect normal/bright  Diabetic foot exam: normal DP and PT pulses, no trophic changes or ulcerative lesions, normal sensory exam, and normal monofilament exam         6/24/2025   Mini Cog   Clock Draw Score 2 Normal   3 Item Recall 3 objects recalled   Mini Cog Total Score 5         Vision Screen  Patient wears corrective lenses (select all that apply): Worn during vision screen, Wears regularly  Vision Screen Results: Pass      Signed Electronically by: HARSH Rowe CNP    "

## 2025-06-24 NOTE — PATIENT INSTRUCTIONS
Patient Education   Preventive Care Advice   This is general advice given by our system to help you stay healthy. However, your care team may have specific advice just for you. Please talk to your care team about your preventive care needs.  Nutrition  Eat 5 or more servings of fruits and vegetables each day.  Try wheat bread, brown rice and whole grain pasta (instead of white bread, rice, and pasta).  Get enough calcium and vitamin D. Check the label on foods and aim for 100% of the RDA (recommended daily allowance).  Lifestyle  Exercise at least 150 minutes each week  (30 minutes a day, 5 days a week).  Do muscle strengthening activities 2 days a week. These help control your weight and prevent disease.  No smoking.  Wear sunscreen to prevent skin cancer.  Have a dental exam and cleaning every 6 months.  Yearly exams  See your health care team every year to talk about:  Any changes in your health.  Any medicines your care team has prescribed.  Preventive care, family planning, and ways to prevent chronic diseases.  Shots (vaccines)   HPV shots (up to age 26), if you've never had them before.  Hepatitis B shots (up to age 59), if you've never had them before.  COVID-19 shot: Get this shot when it's due.  Flu shot: Get a flu shot every year.  Tetanus shot: Get a tetanus shot every 10 years.  Pneumococcal, hepatitis A, and RSV shots: Ask your care team if you need these based on your risk.  Shingles shot (for age 50 and up)  General health tests  Diabetes screening:  Starting at age 35, Get screened for diabetes at least every 3 years.  If you are younger than age 35, ask your care team if you should be screened for diabetes.  Cholesterol test: At age 39, start having a cholesterol test every 5 years, or more often if advised.  Bone density scan (DEXA): At age 50, ask your care team if you should have this scan for osteoporosis (brittle bones).  Hepatitis C: Get tested at least once in your life.  STIs (sexually  transmitted infections)  Before age 24: Ask your care team if you should be screened for STIs.  After age 24: Get screened for STIs if you're at risk. You are at risk for STIs (including HIV) if:  You are sexually active with more than one person.  You don't use condoms every time.  You or a partner was diagnosed with a sexually transmitted infection.  If you are at risk for HIV, ask about PrEP medicine to prevent HIV.  Get tested for HIV at least once in your life, whether you are at risk for HIV or not.  Cancer screening tests  Cervical cancer screening: If you have a cervix, begin getting regular cervical cancer screening tests starting at age 21.  Breast cancer scan (mammogram): If you've ever had breasts, begin having regular mammograms starting at age 40. This is a scan to check for breast cancer.  Colon cancer screening: It is important to start screening for colon cancer at age 45.  Have a colonoscopy test every 10 years (or more often if you're at risk) Or, ask your provider about stool tests like a FIT test every year or Cologuard test every 3 years.  To learn more about your testing options, visit:   .  For help making a decision, visit:   https://bit.ly/pb14009.  Prostate cancer screening test: If you have a prostate, ask your care team if a prostate cancer screening test (PSA) at age 55 is right for you.  Lung cancer screening: If you are a current or former smoker ages 50 to 80, ask your care team if ongoing lung cancer screenings are right for you.  For informational purposes only. Not to replace the advice of your health care provider. Copyright   2023 [facility]. All rights reserved. Clinically reviewed by the [facility]. Meitu [id number] - REV 01/24.  Preventing Falls: Care Instructions  Injuries and health problems such as trouble walking or poor eyesight can increase your risk of falling. So can some medicines. But there are things you can do to help  "prevent falls. You can exercise to get stronger. You can also arrange your home to make it safer.    Talk to your doctor about the medicines you take. Ask if any of them increase the risk of falls and whether they can be changed or stopped.   Try to exercise regularly. It can help improve your strength and balance. This can help lower your risk of falling.         Practice fall safety and prevention.   Wear low-heeled shoes that fit well and give your feet good support. Talk to your doctor if you have foot problems that make this hard.  Carry a cellphone or wear a medical alert device that you can use to call for help.  Use stepladders instead of chairs to reach high objects. Don't climb if you're at risk for falls. Ask for help, if needed.  Wear the correct eyeglasses, if you need them.        Make your home safer.   Remove rugs, cords, clutter, and furniture from walkways.  Keep your house well lit. Use night-lights in hallways and bathrooms.  Install and use sturdy handrails on stairways.  Wear nonskid footwear, even inside. Don't walk barefoot or in socks without shoes.        Be safe outside.   Use handrails, curb cuts, and ramps whenever possible.  Keep your hands free by using a shoulder bag or backpack.  Try to walk in well-lit areas. Watch out for uneven ground, changes in pavement, and debris.  Be careful in the winter. Walk on the grass or gravel when sidewalks are slippery. Use de-icer on steps and walkways. Add non-slip devices to shoes.    Put grab bars and nonskid mats in your shower or tub and near the toilet. Try to use a shower chair or bath bench when bathing.   Get into a tub or shower by putting in your weaker leg first. Get out with your strong side first. Have a phone or medical alert device in the bathroom with you.   Where can you learn more?  Go to https://www.[employer].net/patiented  Enter G117 in the search box to learn more about \"Preventing Falls: Care Instructions.\"  Current as of: " July 31, 2024  Content Version: 14.5    9248-8487 [employer].   Care instructions adapted under license by your healthcare professional. If you have questions about a medical condition or this instruction, always ask your healthcare professional. [employer] disclaims any warranty or liability for your use of this information.    Hearing Loss: Care Instructions  Overview     Hearing loss is a sudden or slow decrease in how well you hear. It can range from slight to profound. Permanent hearing loss can occur with aging. It also can happen when you are exposed long-term to loud noise. Examples include listening to loud music, riding motorcycles, or being around other loud machines.  Hearing loss can affect your work and home life. It can make you feel lonely or depressed. You may feel that you have lost your independence. But hearing aids and other devices can help you hear better and feel connected to others.  Follow-up care is a key part of your treatment and safety. Be sure to make and go to all appointments, and call your doctor if you are having problems. It's also a good idea to know your test results and keep a list of the medicines you take.  How can you care for yourself at home?  Avoid loud noises whenever possible. This helps keep your hearing from getting worse.  Always wear hearing protection around loud noises.  Wear a hearing aid as directed.  A professional can help you pick a hearing aid that will work best for you.  You can also get hearing aids over the counter for mild to moderate hearing loss.  Have hearing tests as your doctor suggests. They can show whether your hearing has changed. Your hearing aid may need to be adjusted.  Use other devices as needed. These may include:  Telephone amplifiers and hearing aids that can connect to a television, stereo, radio, or microphone.  Devices that use lights or vibrations. These alert you to the doorbell, a ringing telephone, or a  "baby monitor.  Television closed-captioning. This shows the words at the bottom of the screen. Most new TVs can do this.  TTY (text telephone). This lets you type messages back and forth on the telephone instead of talking or listening. These devices are also called TDD. When messages are typed on the keyboard, they are sent over the phone line to a receiving TTY. The message is shown on a monitor.  Use text messaging, social media, and email if it is hard for you to communicate by telephone.  Try to learn a listening technique called speechreading. It is not lipreading. You pay attention to people's gestures, expressions, posture, and tone of voice. These clues can help you understand what a person is saying. Face the person you are talking to, and have them face you. Make sure the lighting is good. You need to see the other person's face clearly.  Think about counseling if you need help to adjust to your hearing loss.  When should you call for help?  Watch closely for changes in your health, and be sure to contact your doctor if:    You think your hearing is getting worse.     You have new symptoms, such as dizziness or nausea.   Where can you learn more?  Go to https://www.[employer].net/patiented  Enter R798 in the search box to learn more about \"Hearing Loss: Care Instructions.\"  Current as of: October 27, 2024  Content Version: 14.5 2024-2025 [employer].   Care instructions adapted under license by your healthcare professional. If you have questions about a medical condition or this instruction, always ask your healthcare professional. [employer] disclaims any warranty or liability for your use of this information.    Learning About Sleeping Well  What does sleeping well mean?     Sleeping well means getting enough sleep to feel good and stay healthy. How much sleep is enough varies among people.  The number of hours you sleep and how you feel when you wake up are both important. If " you do not feel refreshed, you probably need more sleep. Another sign of not getting enough sleep is feeling tired during the day.  Experts recommend that adults get at least 7 or more hours of sleep per day. Children and older adults need more sleep.  Why is getting enough sleep important?  Getting enough quality sleep is a basic part of good health. When your sleep suffers, your physical health, mood, and your thoughts can suffer too. You may find yourself feeling more grumpy or stressed. Not getting enough sleep also can lead to serious problems, including injury, accidents, anxiety, and depression.  What might cause poor sleeping?  Many things can cause sleep problems, including:  Changes to your sleep schedule.  Stress. Stress can be caused by fear about a single event, such as giving a speech. Or you may have ongoing stress, such as worry about work or school.  Depression, anxiety, and other mental or emotional conditions.  Changes in your sleep habits or surroundings. This includes changes that happen where you sleep, such as noise, light, or sleeping in a different bed. It also includes changes in your sleep pattern, such as having jet lag or working a late shift.  Health problems, such as pain, breathing problems, and restless legs syndrome.  Lack of regular exercise.  Using alcohol, nicotine, or caffeine before bed.  How can you help yourself?  Here are some tips that may help you sleep more soundly and wake up feeling more refreshed.  Your sleeping area   Use your bedroom only for sleeping and sex. A bit of light reading may help you fall asleep. But if it doesn't, do your reading elsewhere in the house. Try not to use your TV, computer, smartphone, or tablet while you are in bed.  Be sure your bed is big enough to stretch out comfortably, especially if you have a sleep partner.  Keep your bedroom quiet, dark, and cool. Use curtains, blinds, or a sleep mask to block out light. To block out noise, use  "earplugs, soothing music, or a \"white noise\" machine.  Your evening and bedtime routine   Create a relaxing bedtime routine. You might want to take a warm shower or bath, or listen to soothing music.  Go to bed at the same time every night. And get up at the same time every morning, even if you feel tired.  What to avoid   Limit caffeine (coffee, tea, caffeinated sodas) during the day, and don't have any for at least 6 hours before bedtime.  Avoid drinking alcohol before bedtime. Alcohol can cause you to wake up more often during the night.  Try not to smoke or use tobacco, especially in the evening. Nicotine can keep you awake.  Limit naps during the day, especially close to bedtime.  Avoid lying in bed awake for too long. If you can't fall asleep or if you wake up in the middle of the night and can't get back to sleep within about 20 minutes, get out of bed and go to another room until you feel sleepy.  Avoid taking medicine right before bed that may keep you awake or make you feel hyper or energized. Your doctor can tell you if your medicine may do this and if you can take it earlier in the day.  If you can't sleep   Imagine yourself in a peaceful, pleasant scene. Focus on the details and feelings of being in a place that is relaxing.  Get up and do a quiet or boring activity until you feel sleepy.  Avoid drinking any liquids before going to bed to help prevent waking up often to use the bathroom.  Where can you learn more?  Go to https://www.[employer].net/patiented  Enter J942 in the search box to learn more about \"Learning About Sleeping Well.\"  Current as of: July 31, 2024  Content Version: 14.5 2024-2025 [employer].   Care instructions adapted under license by your healthcare professional. If you have questions about a medical condition or this instruction, always ask your healthcare professional. [employer] disclaims any warranty or liability for your use of this information.     "

## 2025-06-25 ENCOUNTER — PATIENT OUTREACH (OUTPATIENT)
Dept: CARE COORDINATION | Facility: CLINIC | Age: 65
End: 2025-06-25
Payer: COMMERCIAL

## 2025-06-25 ENCOUNTER — RESULTS FOLLOW-UP (OUTPATIENT)
Dept: FAMILY MEDICINE | Facility: CLINIC | Age: 65
End: 2025-06-25
Payer: COMMERCIAL

## 2025-07-23 ENCOUNTER — HOSPITAL ENCOUNTER (OUTPATIENT)
Dept: ULTRASOUND IMAGING | Facility: CLINIC | Age: 65
Discharge: HOME | End: 2025-07-23
Attending: NURSE PRACTITIONER
Payer: COMMERCIAL

## 2025-07-23 DIAGNOSIS — Z13.6 SCREENING FOR AAA (ABDOMINAL AORTIC ANEURYSM): ICD-10-CM

## 2025-07-23 PROCEDURE — 76775 US EXAM ABDO BACK WALL LIM: CPT

## (undated) RX ORDER — PROPOFOL 10 MG/ML
INJECTION, EMULSION INTRAVENOUS
Status: DISPENSED
Start: 2021-09-23

## (undated) RX ORDER — LIDOCAINE HYDROCHLORIDE 10 MG/ML
INJECTION, SOLUTION EPIDURAL; INFILTRATION; INTRACAUDAL; PERINEURAL
Status: DISPENSED
Start: 2021-09-23